# Patient Record
Sex: FEMALE | Race: WHITE | NOT HISPANIC OR LATINO | Employment: UNEMPLOYED | URBAN - METROPOLITAN AREA
[De-identification: names, ages, dates, MRNs, and addresses within clinical notes are randomized per-mention and may not be internally consistent; named-entity substitution may affect disease eponyms.]

---

## 2023-04-10 DIAGNOSIS — K21.9 GASTROESOPHAGEAL REFLUX DISEASE WITHOUT ESOPHAGITIS: Primary | ICD-10-CM

## 2023-04-10 PROBLEM — F41.9 ANXIETY: Status: ACTIVE | Noted: 2023-04-10

## 2023-04-10 PROBLEM — L23.1 ALLERGIC CONTACT DERMATITIS DUE TO ADHESIVES: Status: ACTIVE | Noted: 2023-04-10

## 2023-04-10 PROBLEM — N39.3 STRESS INCONTINENCE, FEMALE: Status: ACTIVE | Noted: 2023-04-10

## 2023-04-10 PROBLEM — R51.9 LEFT TEMPORAL HEADACHE: Status: ACTIVE | Noted: 2023-04-10

## 2023-04-10 PROBLEM — R79.89 ELEVATED TSH: Status: ACTIVE | Noted: 2023-04-10

## 2023-04-10 PROBLEM — I87.2 VENOUS (PERIPHERAL) INSUFFICIENCY: Status: ACTIVE | Noted: 2023-04-10

## 2023-04-10 PROBLEM — K59.00 CONSTIPATION, ACUTE: Status: ACTIVE | Noted: 2023-04-10

## 2023-04-10 PROBLEM — R53.83 FATIGUE: Status: ACTIVE | Noted: 2023-04-10

## 2023-04-10 PROBLEM — N39.0 RECURRENT UTI: Status: ACTIVE | Noted: 2023-04-10

## 2023-04-10 PROBLEM — G43.909 MIGRAINE HEADACHE: Status: ACTIVE | Noted: 2023-04-10

## 2023-04-10 PROBLEM — K13.0 ANGULAR CHEILITIS: Status: ACTIVE | Noted: 2023-04-10

## 2023-04-10 PROBLEM — I10 HYPERTENSION: Status: ACTIVE | Noted: 2023-04-10

## 2023-04-10 PROBLEM — N95.0 PMB (POSTMENOPAUSAL BLEEDING): Status: ACTIVE | Noted: 2023-04-10

## 2023-04-10 PROBLEM — Z18.9 RETAINED FOREIGN BODY: Status: ACTIVE | Noted: 2023-04-10

## 2023-04-10 PROBLEM — N93.9 ABNORMAL VAGINAL BLEEDING: Status: ACTIVE | Noted: 2023-04-10

## 2023-04-10 PROBLEM — R20.2 PARESTHESIA OF BOTH FEET: Status: ACTIVE | Noted: 2023-04-10

## 2023-04-10 PROBLEM — E66.3 OVERWEIGHT (BMI 25.0-29.9): Status: ACTIVE | Noted: 2023-04-10

## 2023-04-10 PROBLEM — E87.6 HYPOKALEMIA: Status: ACTIVE | Noted: 2023-04-10

## 2023-04-10 PROBLEM — R60.9 DEPENDENT EDEMA: Status: ACTIVE | Noted: 2023-04-10

## 2023-04-10 PROBLEM — R31.9 HEMATURIA: Status: ACTIVE | Noted: 2023-04-10

## 2023-04-10 PROBLEM — D50.9 IRON DEFICIENCY ANEMIA: Status: ACTIVE | Noted: 2023-04-10

## 2023-04-10 PROBLEM — Z86.011 HISTORY OF MENINGIOMA OF THE BRAIN: Status: ACTIVE | Noted: 2023-04-10

## 2023-04-10 PROBLEM — N39.0 URINARY TRACT INFECTION: Status: ACTIVE | Noted: 2023-04-10

## 2023-04-10 PROBLEM — T36.95XA ANTIBIOTIC-INDUCED YEAST INFECTION: Status: ACTIVE | Noted: 2023-04-10

## 2023-04-10 PROBLEM — R06.02 EXERTIONAL SHORTNESS OF BREATH: Status: ACTIVE | Noted: 2023-04-10

## 2023-04-10 PROBLEM — E87.1 HYPONATREMIA: Status: ACTIVE | Noted: 2023-04-10

## 2023-04-10 PROBLEM — N84.0 ENDOMETRIAL POLYP: Status: ACTIVE | Noted: 2023-04-10

## 2023-04-10 PROBLEM — R68.89 COLD INTOLERANCE: Status: ACTIVE | Noted: 2023-04-10

## 2023-04-10 PROBLEM — R68.89 COLD FEELING: Status: ACTIVE | Noted: 2023-04-10

## 2023-04-10 PROBLEM — R10.9 ABDOMINAL PAIN: Status: ACTIVE | Noted: 2023-04-10

## 2023-04-10 PROBLEM — T18.3XXA FOREIGN BODY IN SMALL INTESTINE: Status: ACTIVE | Noted: 2023-04-10

## 2023-04-10 PROBLEM — K62.5 RECTAL BLEED: Status: ACTIVE | Noted: 2023-04-10

## 2023-04-10 PROBLEM — R42 VERTIGO: Status: ACTIVE | Noted: 2023-04-10

## 2023-04-10 PROBLEM — H93.19 TINNITUS: Status: ACTIVE | Noted: 2023-04-10

## 2023-04-10 PROBLEM — N32.81 OVERACTIVE BLADDER: Status: ACTIVE | Noted: 2023-04-10

## 2023-04-10 PROBLEM — N39.0 ACUTE UTI: Status: ACTIVE | Noted: 2023-04-10

## 2023-04-10 PROBLEM — M54.16 LUMBAR RADICULOPATHY: Status: ACTIVE | Noted: 2023-04-10

## 2023-04-10 PROBLEM — B37.9 ANTIBIOTIC-INDUCED YEAST INFECTION: Status: ACTIVE | Noted: 2023-04-10

## 2023-04-10 PROBLEM — N81.4 UTERINE PROLAPSE: Status: ACTIVE | Noted: 2023-04-10

## 2023-04-10 PROBLEM — R32 URINARY INCONTINENCE IN FEMALE: Status: ACTIVE | Noted: 2023-04-10

## 2023-04-10 PROBLEM — E78.5 BORDERLINE HYPERLIPIDEMIA: Status: ACTIVE | Noted: 2023-04-10

## 2023-04-10 PROBLEM — N95.2 VAGINAL ATROPHY: Status: ACTIVE | Noted: 2023-04-10

## 2023-04-10 RX ORDER — ESOMEPRAZOLE MAGNESIUM 40 MG/1
40 CAPSULE, DELAYED RELEASE ORAL DAILY
Qty: 90 CAPSULE | Refills: 1 | Status: SHIPPED | OUTPATIENT
Start: 2023-04-10 | End: 2023-10-03

## 2023-04-10 RX ORDER — ESOMEPRAZOLE MAGNESIUM 40 MG/1
40 CAPSULE, DELAYED RELEASE ORAL DAILY
COMMUNITY
End: 2023-04-10 | Stop reason: SDUPTHER

## 2023-04-28 DIAGNOSIS — M54.16 LUMBAR RADICULOPATHY: Primary | ICD-10-CM

## 2023-04-28 RX ORDER — GABAPENTIN 100 MG/1
200 CAPSULE ORAL NIGHTLY
COMMUNITY
End: 2023-04-28 | Stop reason: SDUPTHER

## 2023-04-29 DIAGNOSIS — F41.9 ANXIETY: ICD-10-CM

## 2023-04-29 DIAGNOSIS — I10 PRIMARY HYPERTENSION: Primary | ICD-10-CM

## 2023-04-29 RX ORDER — GABAPENTIN 100 MG/1
100-200 CAPSULE ORAL NIGHTLY
Qty: 180 CAPSULE | Refills: 1 | Status: SHIPPED | OUTPATIENT
Start: 2023-04-29 | End: 2023-11-09 | Stop reason: SDUPTHER

## 2023-05-01 PROBLEM — N12 PYELONEPHRITIS: Status: ACTIVE | Noted: 2023-05-01

## 2023-05-01 RX ORDER — LOSARTAN POTASSIUM 100 MG/1
TABLET ORAL
Qty: 90 TABLET | Refills: 1 | Status: SHIPPED | OUTPATIENT
Start: 2023-05-01 | End: 2023-10-18 | Stop reason: WASHOUT

## 2023-05-01 RX ORDER — ESCITALOPRAM OXALATE 10 MG/1
TABLET ORAL
Qty: 90 TABLET | Refills: 1 | Status: SHIPPED | OUTPATIENT
Start: 2023-05-01 | End: 2023-10-26

## 2023-05-15 DIAGNOSIS — E78.5 BORDERLINE HYPERLIPIDEMIA: Primary | ICD-10-CM

## 2023-05-15 RX ORDER — PRAVASTATIN SODIUM 40 MG/1
40 TABLET ORAL DAILY
Qty: 90 TABLET | Refills: 1 | Status: SHIPPED | OUTPATIENT
Start: 2023-05-15 | End: 2023-09-12 | Stop reason: SDUPTHER

## 2023-05-15 RX ORDER — PRAVASTATIN SODIUM 40 MG/1
40 TABLET ORAL DAILY
COMMUNITY
End: 2023-05-15 | Stop reason: SDUPTHER

## 2023-07-06 RX ORDER — TROSPIUM CHLORIDE 20 MG/1
1 TABLET, FILM COATED ORAL 2 TIMES DAILY
COMMUNITY
Start: 2022-02-07 | End: 2023-07-13 | Stop reason: ALTCHOICE

## 2023-07-06 RX ORDER — SODIUM FLUORIDE 5 MG/G
GEL, DENTIFRICE DENTAL
COMMUNITY
Start: 2022-09-18 | End: 2023-10-18 | Stop reason: WASHOUT

## 2023-07-06 RX ORDER — ACETAMINOPHEN 325 MG/1
TABLET ORAL
COMMUNITY
Start: 2021-11-05 | End: 2023-10-18 | Stop reason: WASHOUT

## 2023-07-06 RX ORDER — ZINC SULFATE 50(220)MG
CAPSULE ORAL
COMMUNITY
End: 2023-07-13 | Stop reason: ALTCHOICE

## 2023-07-06 RX ORDER — LORAZEPAM 0.5 MG/1
1 TABLET ORAL DAILY PRN
COMMUNITY
Start: 2014-01-09 | End: 2023-10-18 | Stop reason: WASHOUT

## 2023-07-06 RX ORDER — VIT C/E/ZN/COPPR/LUTEIN/ZEAXAN 250MG-90MG
CAPSULE ORAL
COMMUNITY
End: 2023-07-13 | Stop reason: ALTCHOICE

## 2023-07-06 RX ORDER — WHEAT DEXTRIN 3 G/3.5 G
1 POWDER IN PACKET (EA) ORAL
COMMUNITY
End: 2023-10-18 | Stop reason: WASHOUT

## 2023-07-06 RX ORDER — MUPIROCIN 20 MG/G
OINTMENT TOPICAL
COMMUNITY
Start: 2021-10-20 | End: 2023-07-13 | Stop reason: ALTCHOICE

## 2023-07-06 RX ORDER — OXYCODONE HYDROCHLORIDE 5 MG/1
TABLET ORAL
COMMUNITY
Start: 2021-11-05 | End: 2023-07-13 | Stop reason: ALTCHOICE

## 2023-07-06 RX ORDER — VITC/E/ZINC/COPPER/LUTEIN/ZEAX 250 MG-200
TABLET,CHEWABLE ORAL
COMMUNITY
End: 2023-07-13 | Stop reason: ALTCHOICE

## 2023-07-06 RX ORDER — ESTRADIOL 0.1 MG/G
CREAM VAGINAL
COMMUNITY
Start: 2021-11-05 | End: 2023-07-13 | Stop reason: ALTCHOICE

## 2023-07-06 RX ORDER — CLONIDINE 0.2 MG/24H
PATCH, EXTENDED RELEASE TRANSDERMAL
COMMUNITY
Start: 2022-07-19 | End: 2023-07-13 | Stop reason: ALTCHOICE

## 2023-07-06 RX ORDER — ACETAMINOPHEN 500 MG
TABLET ORAL
COMMUNITY
End: 2023-07-13 | Stop reason: ALTCHOICE

## 2023-07-06 RX ORDER — STRONTIUM CHLORIDE HEXAHYDRATE 100 %
CRYSTALS MISCELLANEOUS
COMMUNITY
End: 2023-07-13 | Stop reason: ALTCHOICE

## 2023-07-06 RX ORDER — CHOLECALCIFEROL (VITAMIN D3) 25 MCG
1 TABLET ORAL DAILY
COMMUNITY
End: 2023-07-13 | Stop reason: ALTCHOICE

## 2023-07-06 RX ORDER — AMLODIPINE BESYLATE 5 MG/1
5 TABLET ORAL DAILY
COMMUNITY
End: 2023-09-11

## 2023-07-06 RX ORDER — ACETAMINOPHEN 160 MG/5ML
1 SUSPENSION, ORAL (FINAL DOSE FORM) ORAL DAILY
COMMUNITY
Start: 2016-02-17

## 2023-07-06 RX ORDER — MULTIVIT-MIN/IRON/FOLIC ACID/K 18-600-40
1000 CAPSULE ORAL DAILY
COMMUNITY
End: 2023-10-18 | Stop reason: ALTCHOICE

## 2023-07-06 RX ORDER — CLONIDINE HYDROCHLORIDE 0.1 MG/1
0.1 TABLET ORAL 2 TIMES DAILY
COMMUNITY
End: 2023-08-15

## 2023-07-06 RX ORDER — IBUPROFEN 600 MG/1
600 TABLET ORAL EVERY 6 HOURS
COMMUNITY
End: 2023-10-18 | Stop reason: WASHOUT

## 2023-07-06 RX ORDER — RIBOFLAVIN (VITAMIN B2) 100 MG
2 TABLET ORAL DAILY
COMMUNITY

## 2023-07-13 ENCOUNTER — OFFICE VISIT (OUTPATIENT)
Dept: PRIMARY CARE | Facility: CLINIC | Age: 78
End: 2023-07-13
Payer: MEDICARE

## 2023-07-13 VITALS
BODY MASS INDEX: 25.47 KG/M2 | HEART RATE: 63 BPM | RESPIRATION RATE: 22 BRPM | SYSTOLIC BLOOD PRESSURE: 124 MMHG | DIASTOLIC BLOOD PRESSURE: 78 MMHG | WEIGHT: 124 LBS

## 2023-07-13 DIAGNOSIS — R20.2 PARESTHESIA OF BOTH FEET: ICD-10-CM

## 2023-07-13 DIAGNOSIS — N32.81 OVERACTIVE BLADDER: ICD-10-CM

## 2023-07-13 DIAGNOSIS — M25.50 ARTHRALGIA, UNSPECIFIED JOINT: ICD-10-CM

## 2023-07-13 DIAGNOSIS — M79.10 MYALGIA: ICD-10-CM

## 2023-07-13 DIAGNOSIS — K21.9 GASTROESOPHAGEAL REFLUX DISEASE WITHOUT ESOPHAGITIS: ICD-10-CM

## 2023-07-13 DIAGNOSIS — E87.6 HYPOKALEMIA: ICD-10-CM

## 2023-07-13 DIAGNOSIS — R21 RASH AND NONSPECIFIC SKIN ERUPTION: ICD-10-CM

## 2023-07-13 DIAGNOSIS — I10 PRIMARY HYPERTENSION: Primary | ICD-10-CM

## 2023-07-13 DIAGNOSIS — M54.16 LUMBAR RADICULOPATHY: ICD-10-CM

## 2023-07-13 PROBLEM — K62.5 RECTAL BLEED: Status: RESOLVED | Noted: 2023-04-10 | Resolved: 2023-07-13

## 2023-07-13 PROBLEM — B37.9 ANTIBIOTIC-INDUCED YEAST INFECTION: Status: RESOLVED | Noted: 2023-04-10 | Resolved: 2023-07-13

## 2023-07-13 PROBLEM — N93.9 ABNORMAL VAGINAL BLEEDING: Status: RESOLVED | Noted: 2023-04-10 | Resolved: 2023-07-13

## 2023-07-13 PROBLEM — T36.95XA ANTIBIOTIC-INDUCED YEAST INFECTION: Status: RESOLVED | Noted: 2023-04-10 | Resolved: 2023-07-13

## 2023-07-13 PROBLEM — R68.89 COLD FEELING: Status: RESOLVED | Noted: 2023-04-10 | Resolved: 2023-07-13

## 2023-07-13 PROBLEM — R60.9 DEPENDENT EDEMA: Status: RESOLVED | Noted: 2023-04-10 | Resolved: 2023-07-13

## 2023-07-13 PROBLEM — R68.89 COLD INTOLERANCE: Status: RESOLVED | Noted: 2023-04-10 | Resolved: 2023-07-13

## 2023-07-13 PROBLEM — N12 PYELONEPHRITIS: Status: RESOLVED | Noted: 2023-05-01 | Resolved: 2023-07-13

## 2023-07-13 PROBLEM — N39.0 ACUTE UTI: Status: RESOLVED | Noted: 2023-04-10 | Resolved: 2023-07-13

## 2023-07-13 PROBLEM — N39.0 URINARY TRACT INFECTION: Status: RESOLVED | Noted: 2023-04-10 | Resolved: 2023-07-13

## 2023-07-13 PROBLEM — N39.0 RECURRENT UTI: Status: RESOLVED | Noted: 2023-04-10 | Resolved: 2023-07-13

## 2023-07-13 PROCEDURE — 82607 VITAMIN B-12: CPT

## 2023-07-13 PROCEDURE — 3078F DIAST BP <80 MM HG: CPT | Performed by: INTERNAL MEDICINE

## 2023-07-13 PROCEDURE — 1036F TOBACCO NON-USER: CPT | Performed by: INTERNAL MEDICINE

## 2023-07-13 PROCEDURE — 1159F MED LIST DOCD IN RCRD: CPT | Performed by: INTERNAL MEDICINE

## 2023-07-13 PROCEDURE — 1126F AMNT PAIN NOTED NONE PRSNT: CPT | Performed by: INTERNAL MEDICINE

## 2023-07-13 PROCEDURE — 85652 RBC SED RATE AUTOMATED: CPT

## 2023-07-13 PROCEDURE — 1160F RVW MEDS BY RX/DR IN RCRD: CPT | Performed by: INTERNAL MEDICINE

## 2023-07-13 PROCEDURE — 82550 ASSAY OF CK (CPK): CPT

## 2023-07-13 PROCEDURE — 3074F SYST BP LT 130 MM HG: CPT | Performed by: INTERNAL MEDICINE

## 2023-07-13 PROCEDURE — 86140 C-REACTIVE PROTEIN: CPT

## 2023-07-13 PROCEDURE — 80048 BASIC METABOLIC PNL TOTAL CA: CPT

## 2023-07-13 PROCEDURE — 99214 OFFICE O/P EST MOD 30 MIN: CPT | Performed by: INTERNAL MEDICINE

## 2023-07-13 RX ORDER — TRIAMCINOLONE ACETONIDE 1 MG/G
OINTMENT TOPICAL 2 TIMES DAILY PRN
Qty: 15 G | Refills: 0 | Status: SHIPPED | OUTPATIENT
Start: 2023-07-13 | End: 2023-10-18 | Stop reason: WASHOUT

## 2023-07-13 ASSESSMENT — ENCOUNTER SYMPTOMS
FREQUENCY: 0
BLOOD IN STOOL: 0
FLANK PAIN: 0
CHEST TIGHTNESS: 0
WHEEZING: 0
VOMITING: 0
DIARRHEA: 0
DIFFICULTY URINATING: 0
BACK PAIN: 1
ABDOMINAL PAIN: 0
CHILLS: 0
DYSURIA: 0
PALPITATIONS: 0
FATIGUE: 0
UNEXPECTED WEIGHT CHANGE: 0
SHORTNESS OF BREATH: 0
APPETITE CHANGE: 0
NAUSEA: 0
ACTIVITY CHANGE: 0
COUGH: 0
FEVER: 0
ARTHRALGIAS: 1
CONSTIPATION: 1

## 2023-07-13 NOTE — ASSESSMENT & PLAN NOTE
Has dry cough-may be due to uncontrolled GERD  On PPI therapy; encouraged her to continue use  Diet modifications

## 2023-07-13 NOTE — PATIENT INSTRUCTIONS
For 2 weeks stop taking your pravastatin (cholesterol medication) and see if your joint and back pain improve-if they do stay off the medication and let me know  If pain persists (continues) even off the medication restart it as it was not the cause   Put the steroid cream that we prescribed to the skin lesions on right lower leg 2 times a day for 1-2 weeks then can use only as needed  We did labs today and will call with results  Continue all other medications as directed   Follow up in 3 months-sooner if needed

## 2023-07-13 NOTE — ASSESSMENT & PLAN NOTE
Will have her stop statin therapy for 2 weeks to see if this helps improve her joint pain  Will do some labs today  May consider adding Meloxicam to help manage joint pain-may just be osteoarthritis

## 2023-07-13 NOTE — PROGRESS NOTES
Subjective   Patient ID: Valerie Franklin is a 77 y.o. female who presents for 6 month follow up.    HPI  Pt here in 6 month follow up.  She is doing ok overall.      She is complaining of dry cough.  She was wondering if her medication is cause.  She was in particular worried about esomeprazole.  She has more joint pain as well-her shoulders in particular.      She continues to have low back pain that radiates down the left leg.   She will use ibuprofen for her headaches.  She switches between Tylenol and ibuprofen use and will take doses almost every day.      Review of Systems   Constitutional:  Negative for activity change, appetite change, chills, fatigue, fever and unexpected weight change.   Respiratory:  Negative for cough, chest tightness, shortness of breath and wheezing.    Cardiovascular:  Negative for chest pain, palpitations and leg swelling.   Gastrointestinal:  Positive for constipation. Negative for abdominal pain, blood in stool, diarrhea, nausea and vomiting.   Genitourinary:  Negative for difficulty urinating, dysuria, flank pain and frequency.   Musculoskeletal:  Positive for arthralgias and back pain.       Objective   /78   Pulse 63   Resp 22   Wt 56.2 kg (124 lb)   BMI 25.47 kg/m²    Physical Exam  Constitutional:       Appearance: Normal appearance.   Cardiovascular:      Rate and Rhythm: Normal rate and regular rhythm.      Heart sounds: Normal heart sounds.   Pulmonary:      Effort: Pulmonary effort is normal.      Breath sounds: Normal breath sounds.   Abdominal:      General: Bowel sounds are normal.      Palpations: Abdomen is soft.   Musculoskeletal:         General: No swelling, deformity or signs of injury. Normal range of motion.      Right lower leg: No edema.      Left lower leg: No edema.   Lymphadenopathy:      Cervical: No cervical adenopathy.   Skin:     General: Skin is warm and dry.      Findings: Lesion (3 small red flat lesions noted-look like skin abrasions on  anterior shin of right leg) present.   Neurological:      Mental Status: She is alert and oriented to person, place, and time.   Psychiatric:         Mood and Affect: Mood normal.         Assessment/Plan   Problem List Items Addressed This Visit       GERD (gastroesophageal reflux disease)     Has dry cough-may be due to uncontrolled GERD  On PPI therapy; encouraged her to continue use  Diet modifications          Relevant Orders    Follow Up In Primary Care - Established    Hypertension - Primary    Relevant Orders    Follow Up In Primary Care - Established    Hypokalemia    Relevant Orders    Basic Metabolic Panel    Follow Up In Primary Care - Established    Lumbar radiculopathy     Chronic issue         Relevant Orders    Follow Up In Primary Care - Established    Overactive bladder    Relevant Orders    Follow Up In Primary Care - Established    Paresthesia of both feet    Relevant Orders    Vitamin B12    Follow Up In Primary Care - Established    Arthralgia     Will have her stop statin therapy for 2 weeks to see if this helps improve her joint pain  Will do some labs today  May consider adding Meloxicam to help manage joint pain-may just be osteoarthritis          Relevant Orders    C-Reactive Protein    Sedimentation Rate    Follow Up In Primary Care - Established     Other Visit Diagnoses       Rash and nonspecific skin eruption        Relevant Medications    triamcinolone (Kenalog) 0.1 % ointment    Other Relevant Orders    Follow Up In Primary Care - Established    Myalgia        Relevant Orders    CK    Follow Up In Primary Care - Established

## 2023-07-14 LAB
ANION GAP IN SER/PLAS: 14 MMOL/L (ref 10–20)
C REACTIVE PROTEIN (MG/L) IN SER/PLAS: 0.15 MG/DL
CALCIUM (MG/DL) IN SER/PLAS: 9.7 MG/DL (ref 8.6–10.6)
CARBON DIOXIDE, TOTAL (MMOL/L) IN SER/PLAS: 28 MMOL/L (ref 21–32)
CHLORIDE (MMOL/L) IN SER/PLAS: 98 MMOL/L (ref 98–107)
COBALAMIN (VITAMIN B12) (PG/ML) IN SER/PLAS: 351 PG/ML (ref 211–911)
CREATINE KINASE (U/L) IN SER/PLAS: 89 U/L (ref 0–215)
CREATININE (MG/DL) IN SER/PLAS: 0.61 MG/DL (ref 0.5–1.05)
GFR FEMALE: >90 ML/MIN/1.73M2
GLUCOSE (MG/DL) IN SER/PLAS: 88 MG/DL (ref 74–99)
POTASSIUM (MMOL/L) IN SER/PLAS: 4.8 MMOL/L (ref 3.5–5.3)
SEDIMENTATION RATE, ERYTHROCYTE: 3 MM/H (ref 0–30)
SODIUM (MMOL/L) IN SER/PLAS: 135 MMOL/L (ref 136–145)
UREA NITROGEN (MG/DL) IN SER/PLAS: 10 MG/DL (ref 6–23)

## 2023-08-15 DIAGNOSIS — I10 PRIMARY HYPERTENSION: Primary | ICD-10-CM

## 2023-08-15 RX ORDER — CLONIDINE HYDROCHLORIDE 0.1 MG/1
0.1 TABLET ORAL 2 TIMES DAILY
Qty: 180 TABLET | Refills: 1 | Status: SHIPPED | OUTPATIENT
Start: 2023-08-15 | End: 2024-02-08

## 2023-09-06 LAB
ALANINE AMINOTRANSFERASE (SGPT) (U/L) IN SER/PLAS: 13 U/L (ref 7–45)
ALBUMIN (G/DL) IN SER/PLAS: 4.1 G/DL (ref 3.4–5)
ALKALINE PHOSPHATASE (U/L) IN SER/PLAS: 61 U/L (ref 33–136)
ANION GAP IN SER/PLAS: 12 MMOL/L (ref 10–20)
ASPARTATE AMINOTRANSFERASE (SGOT) (U/L) IN SER/PLAS: 16 U/L (ref 9–39)
BASOPHILS (10*3/UL) IN BLOOD BY AUTOMATED COUNT: 0.06 X10E9/L (ref 0–0.1)
BASOPHILS/100 LEUKOCYTES IN BLOOD BY AUTOMATED COUNT: 1 % (ref 0–2)
BILIRUBIN TOTAL (MG/DL) IN SER/PLAS: 0.5 MG/DL (ref 0–1.2)
CALCIUM (MG/DL) IN SER/PLAS: 9.4 MG/DL (ref 8.6–10.6)
CARBON DIOXIDE, TOTAL (MMOL/L) IN SER/PLAS: 30 MMOL/L (ref 21–32)
CHLORIDE (MMOL/L) IN SER/PLAS: 98 MMOL/L (ref 98–107)
CREATININE (MG/DL) IN SER/PLAS: 0.64 MG/DL (ref 0.5–1.05)
EOSINOPHILS (10*3/UL) IN BLOOD BY AUTOMATED COUNT: 0.23 X10E9/L (ref 0–0.4)
EOSINOPHILS/100 LEUKOCYTES IN BLOOD BY AUTOMATED COUNT: 3.7 % (ref 0–6)
ERYTHROCYTE DISTRIBUTION WIDTH (RATIO) BY AUTOMATED COUNT: 12.5 % (ref 11.5–14.5)
ERYTHROCYTE MEAN CORPUSCULAR HEMOGLOBIN CONCENTRATION (G/DL) BY AUTOMATED: 33.1 G/DL (ref 32–36)
ERYTHROCYTE MEAN CORPUSCULAR VOLUME (FL) BY AUTOMATED COUNT: 85 FL (ref 80–100)
ERYTHROCYTES (10*6/UL) IN BLOOD BY AUTOMATED COUNT: 4.62 X10E12/L (ref 4–5.2)
FERRITIN (UG/LL) IN SER/PLAS: 54 UG/L (ref 8–150)
GFR FEMALE: >90 ML/MIN/1.73M2
GLUCOSE (MG/DL) IN SER/PLAS: 85 MG/DL (ref 74–99)
HEMATOCRIT (%) IN BLOOD BY AUTOMATED COUNT: 39.3 % (ref 36–46)
HEMOGLOBIN (G/DL) IN BLOOD: 13 G/DL (ref 12–16)
IMMATURE GRANULOCYTES/100 LEUKOCYTES IN BLOOD BY AUTOMATED COUNT: 0.5 % (ref 0–0.9)
IRON (UG/DL) IN SER/PLAS: 100 UG/DL (ref 35–150)
IRON BINDING CAPACITY (UG/DL) IN SER/PLAS: 332 UG/DL (ref 240–445)
IRON SATURATION (%) IN SER/PLAS: 30 % (ref 25–45)
LEUKOCYTES (10*3/UL) IN BLOOD BY AUTOMATED COUNT: 6.3 X10E9/L (ref 4.4–11.3)
LYMPHOCYTES (10*3/UL) IN BLOOD BY AUTOMATED COUNT: 1.45 X10E9/L (ref 0.8–3)
LYMPHOCYTES/100 LEUKOCYTES IN BLOOD BY AUTOMATED COUNT: 23 % (ref 13–44)
MONOCYTES (10*3/UL) IN BLOOD BY AUTOMATED COUNT: 0.54 X10E9/L (ref 0.05–0.8)
MONOCYTES/100 LEUKOCYTES IN BLOOD BY AUTOMATED COUNT: 8.6 % (ref 2–10)
NEUTROPHILS (10*3/UL) IN BLOOD BY AUTOMATED COUNT: 3.99 X10E9/L (ref 1.6–5.5)
NEUTROPHILS/100 LEUKOCYTES IN BLOOD BY AUTOMATED COUNT: 63.2 % (ref 40–80)
NRBC (PER 100 WBCS) BY AUTOMATED COUNT: 0 /100 WBC (ref 0–0)
PLATELETS (10*3/UL) IN BLOOD AUTOMATED COUNT: 301 X10E9/L (ref 150–450)
POTASSIUM (MMOL/L) IN SER/PLAS: 4.2 MMOL/L (ref 3.5–5.3)
PROTEIN TOTAL: 6.5 G/DL (ref 6.4–8.2)
SODIUM (MMOL/L) IN SER/PLAS: 136 MMOL/L (ref 136–145)
UREA NITROGEN (MG/DL) IN SER/PLAS: 9 MG/DL (ref 6–23)

## 2023-09-09 DIAGNOSIS — I10 PRIMARY HYPERTENSION: ICD-10-CM

## 2023-09-11 DIAGNOSIS — I10 PRIMARY HYPERTENSION: ICD-10-CM

## 2023-09-11 RX ORDER — AMLODIPINE BESYLATE 5 MG/1
5 TABLET ORAL DAILY
Qty: 90 TABLET | Refills: 1 | Status: SHIPPED | OUTPATIENT
Start: 2023-09-11 | End: 2024-03-07

## 2023-09-11 RX ORDER — AMLODIPINE BESYLATE 5 MG/1
5 TABLET ORAL DAILY
Qty: 90 TABLET | Refills: 1 | OUTPATIENT
Start: 2023-09-11

## 2023-09-12 DIAGNOSIS — E78.5 BORDERLINE HYPERLIPIDEMIA: ICD-10-CM

## 2023-09-12 RX ORDER — PRAVASTATIN SODIUM 40 MG/1
40 TABLET ORAL DAILY
Qty: 90 TABLET | Refills: 1 | Status: SHIPPED | OUTPATIENT
Start: 2023-09-12 | End: 2024-01-22 | Stop reason: ALTCHOICE

## 2023-09-18 PROBLEM — C44.92 SCC (SQUAMOUS CELL CARCINOMA): Status: ACTIVE | Noted: 2023-09-18

## 2023-09-18 PROBLEM — H49.20 6TH NERVE PALSY: Status: ACTIVE | Noted: 2023-09-18

## 2023-09-18 RX ORDER — LOSARTAN POTASSIUM 50 MG/1
1 TABLET ORAL NIGHTLY
COMMUNITY
Start: 2020-08-06 | End: 2023-11-01

## 2023-09-18 RX ORDER — AMLODIPINE AND OLMESARTAN MEDOXOMIL 10; 40 MG/1; MG/1
TABLET ORAL
COMMUNITY
Start: 2012-05-09 | End: 2023-10-18 | Stop reason: WASHOUT

## 2023-09-18 RX ORDER — IBUPROFEN 100 MG/5ML
1000 SUSPENSION, ORAL (FINAL DOSE FORM) ORAL
COMMUNITY

## 2023-09-18 RX ORDER — VIT C/E/ZN/COPPR/LUTEIN/ZEAXAN 250MG-90MG
2000 CAPSULE ORAL
COMMUNITY
End: 2023-10-18 | Stop reason: WASHOUT

## 2023-09-18 RX ORDER — CLONIDINE HYDROCHLORIDE 0.1 MG/1
0.1 TABLET ORAL 2 TIMES DAILY
COMMUNITY
Start: 2018-07-06 | End: 2023-10-18 | Stop reason: WASHOUT

## 2023-09-18 RX ORDER — ACETAMINOPHEN 160 MG/5ML
100 SUSPENSION, ORAL (FINAL DOSE FORM) ORAL 2 TIMES DAILY
COMMUNITY
End: 2023-10-18 | Stop reason: SDUPTHER

## 2023-10-03 DIAGNOSIS — K21.9 GASTROESOPHAGEAL REFLUX DISEASE WITHOUT ESOPHAGITIS: ICD-10-CM

## 2023-10-03 RX ORDER — ESOMEPRAZOLE MAGNESIUM 40 MG/1
40 CAPSULE, DELAYED RELEASE ORAL DAILY
Qty: 90 CAPSULE | Refills: 1 | Status: SHIPPED | OUTPATIENT
Start: 2023-10-03 | End: 2023-10-18 | Stop reason: WASHOUT

## 2023-10-18 ENCOUNTER — OFFICE VISIT (OUTPATIENT)
Dept: PRIMARY CARE | Facility: CLINIC | Age: 78
End: 2023-10-18
Payer: MEDICARE

## 2023-10-18 ENCOUNTER — ANCILLARY PROCEDURE (OUTPATIENT)
Dept: RADIOLOGY | Facility: CLINIC | Age: 78
End: 2023-10-18
Payer: MEDICARE

## 2023-10-18 VITALS
HEART RATE: 68 BPM | SYSTOLIC BLOOD PRESSURE: 126 MMHG | DIASTOLIC BLOOD PRESSURE: 84 MMHG | WEIGHT: 126.3 LBS | BODY MASS INDEX: 26.4 KG/M2

## 2023-10-18 DIAGNOSIS — M79.10 MYALGIA: ICD-10-CM

## 2023-10-18 DIAGNOSIS — R07.9 CHEST PAIN, UNSPECIFIED TYPE: ICD-10-CM

## 2023-10-18 DIAGNOSIS — R20.2 PARESTHESIA OF BOTH FEET: ICD-10-CM

## 2023-10-18 DIAGNOSIS — N32.81 OVERACTIVE BLADDER: ICD-10-CM

## 2023-10-18 DIAGNOSIS — E87.6 HYPOKALEMIA: ICD-10-CM

## 2023-10-18 DIAGNOSIS — K21.9 GASTROESOPHAGEAL REFLUX DISEASE WITHOUT ESOPHAGITIS: ICD-10-CM

## 2023-10-18 DIAGNOSIS — M54.16 LUMBAR RADICULOPATHY: ICD-10-CM

## 2023-10-18 DIAGNOSIS — M25.50 ARTHRALGIA, UNSPECIFIED JOINT: ICD-10-CM

## 2023-10-18 DIAGNOSIS — I10 ESSENTIAL HYPERTENSION, BENIGN: Primary | ICD-10-CM

## 2023-10-18 DIAGNOSIS — R06.02 EXERTIONAL SHORTNESS OF BREATH: ICD-10-CM

## 2023-10-18 PROCEDURE — 1159F MED LIST DOCD IN RCRD: CPT | Performed by: INTERNAL MEDICINE

## 2023-10-18 PROCEDURE — 3074F SYST BP LT 130 MM HG: CPT | Performed by: INTERNAL MEDICINE

## 2023-10-18 PROCEDURE — 1036F TOBACCO NON-USER: CPT | Performed by: INTERNAL MEDICINE

## 2023-10-18 PROCEDURE — 99214 OFFICE O/P EST MOD 30 MIN: CPT | Performed by: INTERNAL MEDICINE

## 2023-10-18 PROCEDURE — 1126F AMNT PAIN NOTED NONE PRSNT: CPT | Performed by: INTERNAL MEDICINE

## 2023-10-18 PROCEDURE — 71046 X-RAY EXAM CHEST 2 VIEWS: CPT | Performed by: RADIOLOGY

## 2023-10-18 PROCEDURE — 93000 ELECTROCARDIOGRAM COMPLETE: CPT | Performed by: INTERNAL MEDICINE

## 2023-10-18 PROCEDURE — 3079F DIAST BP 80-89 MM HG: CPT | Performed by: INTERNAL MEDICINE

## 2023-10-18 PROCEDURE — 1160F RVW MEDS BY RX/DR IN RCRD: CPT | Performed by: INTERNAL MEDICINE

## 2023-10-18 PROCEDURE — 71046 X-RAY EXAM CHEST 2 VIEWS: CPT | Mod: FY

## 2023-10-18 RX ORDER — FERROUS SULFATE 325(65) MG
65 TABLET, DELAYED RELEASE (ENTERIC COATED) ORAL EVERY OTHER DAY
COMMUNITY

## 2023-10-18 RX ORDER — ESOMEPRAZOLE MAGNESIUM 40 MG/1
40 CAPSULE, DELAYED RELEASE ORAL DAILY
Qty: 90 CAPSULE | Refills: 1
Start: 2023-10-18 | End: 2024-04-04

## 2023-10-18 ASSESSMENT — ENCOUNTER SYMPTOMS
CONSTIPATION: 0
DIFFICULTY URINATING: 0
VOMITING: 0
COUGH: 0
NAUSEA: 0
FATIGUE: 1
ACTIVITY CHANGE: 0
UNEXPECTED WEIGHT CHANGE: 0
SHORTNESS OF BREATH: 1
LIGHT-HEADEDNESS: 0
HEADACHES: 0
FEVER: 0
CHILLS: 0
APPETITE CHANGE: 0
WHEEZING: 0
CHEST TIGHTNESS: 0
ABDOMINAL PAIN: 0
PALPITATIONS: 0
DIARRHEA: 0
DIZZINESS: 0

## 2023-10-18 NOTE — PATIENT INSTRUCTIONS
Get xray of chest today due to some chest tightness and breathing changes   We did EKG today and would like you to call to schedule stress test (we are doing dobutamine since you cannot walk long on treadmill)  Make sure you are taking Esomeprazole 40 mg once a day  Avoid and/or lessen amount of tomato based/caffeine/alcohol/tobacco use/spicy foods/peppers/onions/citrus and antiinflammatories as well as avoiding eating late at night due to chest pain which may be acid related  Get your flu shot when able  Follow up in 3 months-full physical at that time; sooner based on results

## 2023-10-18 NOTE — ASSESSMENT & PLAN NOTE
Atypical and likely GERD related  EKG was noted to be ok here in office  Will have her do stress test-unable to do treadmill due to back pain so will do dobutamine  Xray chest due to chest tightness/sob noted

## 2023-10-18 NOTE — PROGRESS NOTES
Subjective   Patient ID: Valerie Franklin is a 77 y.o. female who presents for Follow-up (3m).    HPI  Pt here for 3 month follow up.      She tells me she has pressure sensation of chest almost daily.  The pain is center in chest.  She feels more short of breath.  She will often find herself taking a deep breath to try to get sensation to go away.  She denies movement of pain and doesn't feel it is worse with exertion.  No lightheadedness or dizzy spells.  She notes it more in AM but can be throughout day.  She is not on PPI daily-she is only using Omeprazole PRN at this time.    She has cut back on caffeine to half and half.  She will eat some spicy foods but will note it bothers her stomach so she doesn't eat much.  She does have one alcohol beverage a night.      Review of Systems   Constitutional:  Positive for fatigue. Negative for activity change, appetite change, chills, fever and unexpected weight change.   Respiratory:  Positive for shortness of breath. Negative for cough, chest tightness and wheezing.    Cardiovascular:  Positive for chest pain. Negative for palpitations and leg swelling.   Gastrointestinal:  Negative for abdominal pain, constipation, diarrhea, nausea and vomiting.   Genitourinary:  Negative for difficulty urinating.   Neurological:  Negative for dizziness, light-headedness and headaches.       Objective   /84 (BP Location: Left arm, Patient Position: Sitting)   Pulse 68   Wt 57.3 kg (126 lb 4.8 oz)   BMI 26.40 kg/m²    Physical Exam  Constitutional:       Appearance: Normal appearance.   Cardiovascular:      Rate and Rhythm: Normal rate and regular rhythm.      Heart sounds: Normal heart sounds.   Pulmonary:      Effort: Pulmonary effort is normal.      Breath sounds: Normal breath sounds.   Abdominal:      General: Abdomen is flat. Bowel sounds are normal. There is no distension.      Palpations: Abdomen is soft.      Tenderness: There is no abdominal tenderness.    Musculoskeletal:         General: Normal range of motion.      Right lower leg: No edema.      Left lower leg: No edema.   Neurological:      Mental Status: She is alert and oriented to person, place, and time.   Psychiatric:         Mood and Affect: Mood normal.         Assessment/Plan   Problem List Items Addressed This Visit       Exertional shortness of breath    Relevant Orders    Follow Up In Primary Care - Medicare Annual    GERD (gastroesophageal reflux disease)     Pt on PPI therapy  She does have caffeine/alcohol daily  Asked her to stop alcohol if able before bed as GERD can worsen at night and this could be cause of heaviness she feels in chest especially in AM          Relevant Medications    esomeprazole (NexIUM) 40 mg DR capsule    Other Relevant Orders    Follow Up In Primary Care - Medicare Annual    Hypokalemia    Relevant Orders    Follow Up In Primary Care - Medicare Annual    Lumbar radiculopathy    Relevant Orders    Follow Up In Primary Care - Medicare Annual    Overactive bladder    Relevant Orders    Follow Up In Primary Care - Medicare Annual    Paresthesia of both feet    Relevant Orders    Follow Up In Primary Care - Medicare Annual    Arthralgia    Relevant Orders    Follow Up In Primary Care - Medicare Annual    Essential hypertension, benign - Primary    Relevant Orders    Follow Up In Primary Care - Medicare Annual    Chest pain     Atypical and likely GERD related  EKG was noted to be ok here in office  Will have her do stress test-unable to do treadmill due to back pain so will do dobutamine  Xray chest due to chest tightness/sob noted         Relevant Orders    XR chest 2 views    ECG 12 Lead (Completed)    Echocardiogram Stress Test    Follow Up In Primary Care - Medicare Annual     Other Visit Diagnoses       Myalgia        Relevant Orders    Follow Up In Primary Care - Medicare Annual

## 2023-10-18 NOTE — ASSESSMENT & PLAN NOTE
Pt on PPI therapy  She does have caffeine/alcohol daily  Asked her to stop alcohol if able before bed as GERD can worsen at night and this could be cause of heaviness she feels in chest especially in AM

## 2023-10-19 ENCOUNTER — TELEPHONE (OUTPATIENT)
Dept: PRIMARY CARE | Facility: CLINIC | Age: 78
End: 2023-10-19
Payer: MEDICARE

## 2023-10-19 NOTE — TELEPHONE ENCOUNTER
----- Message from Alysia HOWE DO sent at 10/19/2023  9:54 AM EDT -----  Let pt know chest xray was normal/no fluid noted or abnormalities in lungs

## 2023-10-26 DIAGNOSIS — F41.9 ANXIETY: ICD-10-CM

## 2023-10-26 DIAGNOSIS — I10 PRIMARY HYPERTENSION: ICD-10-CM

## 2023-10-26 RX ORDER — ESCITALOPRAM OXALATE 10 MG/1
TABLET ORAL
Qty: 90 TABLET | Refills: 1 | Status: SHIPPED | OUTPATIENT
Start: 2023-10-26 | End: 2024-04-24

## 2023-10-26 RX ORDER — LOSARTAN POTASSIUM 50 MG/1
50 TABLET ORAL DAILY
Qty: 90 TABLET | Refills: 1 | Status: SHIPPED | OUTPATIENT
Start: 2023-10-26 | End: 2023-11-01

## 2023-10-31 DIAGNOSIS — I10 PRIMARY HYPERTENSION: ICD-10-CM

## 2023-11-01 RX ORDER — LOSARTAN POTASSIUM 100 MG/1
100 TABLET ORAL DAILY
Qty: 90 TABLET | Refills: 1 | Status: SHIPPED | OUTPATIENT
Start: 2023-11-01 | End: 2024-04-24

## 2023-11-09 DIAGNOSIS — M54.16 LUMBAR RADICULOPATHY: ICD-10-CM

## 2023-11-09 RX ORDER — GABAPENTIN 100 MG/1
100-200 CAPSULE ORAL NIGHTLY
Qty: 180 CAPSULE | Refills: 1 | Status: SHIPPED | OUTPATIENT
Start: 2023-11-09 | End: 2024-05-08 | Stop reason: SDUPTHER

## 2023-12-04 ENCOUNTER — APPOINTMENT (OUTPATIENT)
Dept: OBSTETRICS AND GYNECOLOGY | Facility: CLINIC | Age: 78
End: 2023-12-04
Payer: MEDICARE

## 2023-12-04 DIAGNOSIS — R07.9 CHEST PAIN, UNSPECIFIED TYPE: Primary | ICD-10-CM

## 2023-12-11 RX ORDER — ATROPINE SULFATE 0.1 MG/ML
.25-5 INJECTION INTRAVENOUS ONCE AS NEEDED
Qty: 5 ML | Refills: 0 | Status: SHIPPED | OUTPATIENT
Start: 2023-12-11 | End: 2024-01-22 | Stop reason: ALTCHOICE

## 2023-12-11 RX ORDER — DOBUTAMINE HYDROCHLORIDE 100 MG/100ML
5-40 INJECTION INTRAVENOUS CONTINUOUS
Qty: 250 ML | Refills: 0 | Status: SHIPPED | OUTPATIENT
Start: 2023-12-11 | End: 2024-01-22 | Stop reason: ALTCHOICE

## 2023-12-12 ENCOUNTER — HOSPITAL ENCOUNTER (OUTPATIENT)
Dept: CARDIOLOGY | Facility: HOSPITAL | Age: 78
Discharge: HOME | End: 2023-12-12
Payer: MEDICARE

## 2023-12-12 VITALS — WEIGHT: 126 LBS | BODY MASS INDEX: 26.34 KG/M2

## 2023-12-12 DIAGNOSIS — R07.9 CHEST PAIN, UNSPECIFIED TYPE: ICD-10-CM

## 2023-12-12 PROCEDURE — 93350 STRESS TTE ONLY: CPT | Performed by: STUDENT IN AN ORGANIZED HEALTH CARE EDUCATION/TRAINING PROGRAM

## 2023-12-12 PROCEDURE — 2500000004 HC RX 250 GENERAL PHARMACY W/ HCPCS (ALT 636 FOR OP/ED): Performed by: INTERNAL MEDICINE

## 2023-12-12 PROCEDURE — 93017 CV STRESS TEST TRACING ONLY: CPT

## 2023-12-12 PROCEDURE — 93018 CV STRESS TEST I&R ONLY: CPT | Performed by: STUDENT IN AN ORGANIZED HEALTH CARE EDUCATION/TRAINING PROGRAM

## 2023-12-12 PROCEDURE — 93016 CV STRESS TEST SUPVJ ONLY: CPT | Performed by: STUDENT IN AN ORGANIZED HEALTH CARE EDUCATION/TRAINING PROGRAM

## 2023-12-12 RX ORDER — ATROPINE SULFATE 0.4 MG/ML
.25-5 INJECTION, SOLUTION ENDOTRACHEAL; INTRAMEDULLARY; INTRAMUSCULAR; INTRAVENOUS; SUBCUTANEOUS
OUTPATIENT
Start: 2023-12-12

## 2023-12-12 RX ADMIN — DOBUTAMINE HYDROCHLORIDE 20 MCG/KG/MIN: 100 INJECTION INTRAVENOUS at 11:00

## 2024-01-22 ENCOUNTER — OFFICE VISIT (OUTPATIENT)
Dept: PRIMARY CARE | Facility: CLINIC | Age: 79
End: 2024-01-22
Payer: MEDICARE

## 2024-01-22 VITALS
WEIGHT: 127.2 LBS | DIASTOLIC BLOOD PRESSURE: 98 MMHG | HEIGHT: 58 IN | BODY MASS INDEX: 26.7 KG/M2 | RESPIRATION RATE: 16 BRPM | SYSTOLIC BLOOD PRESSURE: 146 MMHG | HEART RATE: 80 BPM

## 2024-01-22 DIAGNOSIS — Z00.00 ROUTINE GENERAL MEDICAL EXAMINATION AT A HEALTH CARE FACILITY: ICD-10-CM

## 2024-01-22 DIAGNOSIS — Z86.011 HISTORY OF MENINGIOMA OF THE BRAIN: ICD-10-CM

## 2024-01-22 DIAGNOSIS — N32.81 OVERACTIVE BLADDER: ICD-10-CM

## 2024-01-22 DIAGNOSIS — M54.16 LUMBAR RADICULOPATHY: ICD-10-CM

## 2024-01-22 DIAGNOSIS — D50.9 IRON DEFICIENCY ANEMIA, UNSPECIFIED IRON DEFICIENCY ANEMIA TYPE: ICD-10-CM

## 2024-01-22 DIAGNOSIS — R20.2 PARESTHESIA OF BOTH FEET: ICD-10-CM

## 2024-01-22 DIAGNOSIS — M25.50 ARTHRALGIA, UNSPECIFIED JOINT: ICD-10-CM

## 2024-01-22 DIAGNOSIS — M79.10 MYALGIA: ICD-10-CM

## 2024-01-22 DIAGNOSIS — Z12.31 ENCOUNTER FOR SCREENING MAMMOGRAM FOR MALIGNANT NEOPLASM OF BREAST: ICD-10-CM

## 2024-01-22 DIAGNOSIS — E66.3 OVERWEIGHT (BMI 25.0-29.9): ICD-10-CM

## 2024-01-22 DIAGNOSIS — K21.9 GASTROESOPHAGEAL REFLUX DISEASE WITHOUT ESOPHAGITIS: ICD-10-CM

## 2024-01-22 DIAGNOSIS — Z00.00 MEDICARE ANNUAL WELLNESS VISIT, SUBSEQUENT: Primary | ICD-10-CM

## 2024-01-22 DIAGNOSIS — E87.6 HYPOKALEMIA: ICD-10-CM

## 2024-01-22 DIAGNOSIS — E78.5 BORDERLINE HYPERLIPIDEMIA: ICD-10-CM

## 2024-01-22 DIAGNOSIS — Z78.0 POST-MENOPAUSE: ICD-10-CM

## 2024-01-22 DIAGNOSIS — I10 ESSENTIAL HYPERTENSION, BENIGN: ICD-10-CM

## 2024-01-22 PROBLEM — R07.9 CHEST PAIN: Status: RESOLVED | Noted: 2023-10-18 | Resolved: 2024-01-22

## 2024-01-22 PROBLEM — R53.83 FATIGUE: Status: RESOLVED | Noted: 2023-04-10 | Resolved: 2024-01-22

## 2024-01-22 PROBLEM — K13.0 ANGULAR CHEILITIS: Status: RESOLVED | Noted: 2023-04-10 | Resolved: 2024-01-22

## 2024-01-22 LAB
ALBUMIN SERPL BCP-MCNC: 4.7 G/DL (ref 3.4–5)
ALP SERPL-CCNC: 74 U/L (ref 33–136)
ALT SERPL W P-5'-P-CCNC: 19 U/L (ref 7–45)
AMORPH CRY #/AREA UR COMP ASSIST: ABNORMAL /HPF
ANION GAP SERPL CALC-SCNC: 15 MMOL/L (ref 10–20)
APPEARANCE UR: ABNORMAL
AST SERPL W P-5'-P-CCNC: 20 U/L (ref 9–39)
BACTERIA #/AREA URNS AUTO: ABNORMAL /HPF
BILIRUB SERPL-MCNC: 0.6 MG/DL (ref 0–1.2)
BILIRUB UR STRIP.AUTO-MCNC: NEGATIVE MG/DL
BUN SERPL-MCNC: 9 MG/DL (ref 6–23)
CALCIUM SERPL-MCNC: 9.8 MG/DL (ref 8.6–10.6)
CHLORIDE SERPL-SCNC: 99 MMOL/L (ref 98–107)
CHOLEST SERPL-MCNC: 195 MG/DL (ref 0–199)
CHOLESTEROL/HDL RATIO: 2.5
CO2 SERPL-SCNC: 28 MMOL/L (ref 21–32)
COLOR UR: YELLOW
CREAT SERPL-MCNC: 0.67 MG/DL (ref 0.5–1.05)
EGFRCR SERPLBLD CKD-EPI 2021: 90 ML/MIN/1.73M*2
ERYTHROCYTE [DISTWIDTH] IN BLOOD BY AUTOMATED COUNT: 13 % (ref 11.5–14.5)
GLUCOSE SERPL-MCNC: 78 MG/DL (ref 74–99)
GLUCOSE UR STRIP.AUTO-MCNC: NEGATIVE MG/DL
HCT VFR BLD AUTO: 44.3 % (ref 36–46)
HDLC SERPL-MCNC: 78.6 MG/DL
HGB BLD-MCNC: 14.5 G/DL (ref 12–16)
KETONES UR STRIP.AUTO-MCNC: NEGATIVE MG/DL
LDLC SERPL CALC-MCNC: 99 MG/DL
LEUKOCYTE ESTERASE UR QL STRIP.AUTO: ABNORMAL
MCH RBC QN AUTO: 27.3 PG (ref 26–34)
MCHC RBC AUTO-ENTMCNC: 32.7 G/DL (ref 32–36)
MCV RBC AUTO: 83 FL (ref 80–100)
NITRITE UR QL STRIP.AUTO: POSITIVE
NON HDL CHOLESTEROL: 116 MG/DL (ref 0–149)
NRBC BLD-RTO: 0 /100 WBCS (ref 0–0)
PH UR STRIP.AUTO: 7 [PH]
PLATELET # BLD AUTO: 356 X10*3/UL (ref 150–450)
POTASSIUM SERPL-SCNC: 3.7 MMOL/L (ref 3.5–5.3)
PROT SERPL-MCNC: 7.5 G/DL (ref 6.4–8.2)
PROT UR STRIP.AUTO-MCNC: NEGATIVE MG/DL
RBC # BLD AUTO: 5.31 X10*6/UL (ref 4–5.2)
RBC # UR STRIP.AUTO: NEGATIVE /UL
RBC #/AREA URNS AUTO: ABNORMAL /HPF
SODIUM SERPL-SCNC: 138 MMOL/L (ref 136–145)
SP GR UR STRIP.AUTO: 1.01
TRIGL SERPL-MCNC: 88 MG/DL (ref 0–149)
UROBILINOGEN UR STRIP.AUTO-MCNC: <2 MG/DL
VLDL: 18 MG/DL (ref 0–40)
WBC # BLD AUTO: 5.9 X10*3/UL (ref 4.4–11.3)
WBC #/AREA URNS AUTO: ABNORMAL /HPF

## 2024-01-22 PROCEDURE — 3077F SYST BP >= 140 MM HG: CPT | Performed by: INTERNAL MEDICINE

## 2024-01-22 PROCEDURE — G0439 PPPS, SUBSEQ VISIT: HCPCS | Performed by: INTERNAL MEDICINE

## 2024-01-22 PROCEDURE — 1123F ACP DISCUSS/DSCN MKR DOCD: CPT | Performed by: INTERNAL MEDICINE

## 2024-01-22 PROCEDURE — 36415 COLL VENOUS BLD VENIPUNCTURE: CPT

## 2024-01-22 PROCEDURE — 99397 PER PM REEVAL EST PAT 65+ YR: CPT | Performed by: INTERNAL MEDICINE

## 2024-01-22 PROCEDURE — 85027 COMPLETE CBC AUTOMATED: CPT

## 2024-01-22 PROCEDURE — 80061 LIPID PANEL: CPT

## 2024-01-22 PROCEDURE — 80053 COMPREHEN METABOLIC PANEL: CPT

## 2024-01-22 PROCEDURE — 1160F RVW MEDS BY RX/DR IN RCRD: CPT | Performed by: INTERNAL MEDICINE

## 2024-01-22 PROCEDURE — 81001 URINALYSIS AUTO W/SCOPE: CPT

## 2024-01-22 PROCEDURE — 1170F FXNL STATUS ASSESSED: CPT | Performed by: INTERNAL MEDICINE

## 2024-01-22 PROCEDURE — 1159F MED LIST DOCD IN RCRD: CPT | Performed by: INTERNAL MEDICINE

## 2024-01-22 PROCEDURE — 3080F DIAST BP >= 90 MM HG: CPT | Performed by: INTERNAL MEDICINE

## 2024-01-22 PROCEDURE — 1126F AMNT PAIN NOTED NONE PRSNT: CPT | Performed by: INTERNAL MEDICINE

## 2024-01-22 PROCEDURE — 1036F TOBACCO NON-USER: CPT | Performed by: INTERNAL MEDICINE

## 2024-01-22 ASSESSMENT — ENCOUNTER SYMPTOMS
CONFUSION: 0
NECK STIFFNESS: 0
BRUISES/BLEEDS EASILY: 0
SPEECH DIFFICULTY: 0
DIARRHEA: 0
APNEA: 0
CHILLS: 0
NECK PAIN: 0
ADENOPATHY: 0
ARTHRALGIAS: 1
ANAL BLEEDING: 0
LOSS OF SENSATION IN FEET: 1
NAUSEA: 0
AGITATION: 0
PALPITATIONS: 0
FACIAL ASYMMETRY: 0
POLYPHAGIA: 0
BLOOD IN STOOL: 0
NERVOUS/ANXIOUS: 0
HALLUCINATIONS: 0
SHORTNESS OF BREATH: 0
HEMATURIA: 0
OCCASIONAL FEELINGS OF UNSTEADINESS: 0
FLANK PAIN: 0
RHINORRHEA: 0
EYE ITCHING: 0
DIFFICULTY URINATING: 0
SEIZURES: 0
ACTIVITY CHANGE: 0
WEAKNESS: 0
SLEEP DISTURBANCE: 0
COLOR CHANGE: 0
TREMORS: 0
VOICE CHANGE: 0
WHEEZING: 0
RECTAL PAIN: 0
CONSTIPATION: 0
TROUBLE SWALLOWING: 0
APPETITE CHANGE: 0
POLYDIPSIA: 0
DIAPHORESIS: 0
ABDOMINAL DISTENTION: 0
LIGHT-HEADEDNESS: 0
DYSPHORIC MOOD: 0
FEVER: 0
HEADACHES: 1
PHOTOPHOBIA: 0
FREQUENCY: 1
DECREASED CONCENTRATION: 0
NUMBNESS: 0
CHOKING: 0
DEPRESSION: 0
MYALGIAS: 0
DYSURIA: 0
COUGH: 0
FATIGUE: 0
EYE DISCHARGE: 0
SINUS PAIN: 0
BACK PAIN: 1
UNEXPECTED WEIGHT CHANGE: 0
VOMITING: 0
ABDOMINAL PAIN: 0
DIZZINESS: 0
HYPERACTIVE: 0
STRIDOR: 0
EYE PAIN: 0
FACIAL SWELLING: 0
EYE REDNESS: 0
SORE THROAT: 0
CHEST TIGHTNESS: 0
JOINT SWELLING: 0
WOUND: 0
SINUS PRESSURE: 0

## 2024-01-22 ASSESSMENT — PATIENT HEALTH QUESTIONNAIRE - PHQ9
SUM OF ALL RESPONSES TO PHQ9 QUESTIONS 1 AND 2: 0
1. LITTLE INTEREST OR PLEASURE IN DOING THINGS: NOT AT ALL
2. FEELING DOWN, DEPRESSED OR HOPELESS: NOT AT ALL

## 2024-01-22 ASSESSMENT — ACTIVITIES OF DAILY LIVING (ADL)
GROCERY_SHOPPING: INDEPENDENT
MANAGING_FINANCES: INDEPENDENT
TAKING_MEDICATION: INDEPENDENT
BATHING: INDEPENDENT
DOING_HOUSEWORK: INDEPENDENT
DRESSING: INDEPENDENT

## 2024-01-22 NOTE — PATIENT INSTRUCTIONS
Stop pravastatin to see if still needed; continue to focus on healthy diet and remain active as able  Please get a living will and DPOA of healthcare established and bring me copies of these results so we can have them on file and know your medical wishes   We did blood work today and will call with abnormal results  Call and schedule mammogram and bone density -orders are in (can be done here or at [facility])  Call PT and get back in to work on left sided low back pain  Continue all medications as directed-call when refills needed  Continue to work on healthy diet and exercise regularly as able  Follow up here in 6 months

## 2024-01-22 NOTE — PROGRESS NOTES
Subjective   Reason for Visit: Valerie Franklin is an 78 y.o. female here for a Medicare Wellness visit.     Past Medical, Surgical, and Family History reviewed and updated in chart.    Reviewed all medications by prescribing practitioner or clinical pharmacist (such as prescriptions, OTCs, herbal therapies and supplements) and documented in the medical record.    HPI  Pt here for MWV.      She fell recently when smashing cardboard boxes in her den.  She did fall back bumped her right elbow into the bookshelf.  She didn't tear any skin but did feel bruised.  She did not hit her head.      She is asking about stopping her statin. She would like trial off it and wants to see if still needed.      She has not taken her BP medications yet today.  Her BP is high.      She had colonoscopy in 2020 that was normal.  Repeat noted for 10 years.  She has some constipation but manages with prune juice and fiber gummies.      She had normal CMP/CBC in 9/2023 including ferritin.  She uses iron every other day.      Patient Care Team:  Alysia HOWE DO as PCP - General  Alysia HOWE DO as PCP - [employer] PCP     Review of Systems   Constitutional:  Negative for activity change, appetite change, chills, diaphoresis, fatigue, fever and unexpected weight change.   HENT:  Negative for congestion, dental problem, drooling, ear discharge, ear pain, facial swelling, hearing loss, mouth sores, nosebleeds, postnasal drip, rhinorrhea, sinus pressure, sinus pain, sneezing, sore throat, tinnitus, trouble swallowing and voice change.    Eyes:  Positive for visual disturbance (no recent exam). Negative for photophobia, pain, discharge, redness and itching.   Respiratory:  Negative for apnea, cough, choking, chest tightness, shortness of breath, wheezing and stridor.    Cardiovascular:  Negative for chest pain, palpitations and leg swelling.   Gastrointestinal:  Negative for abdominal distention, abdominal pain, anal bleeding,  "blood in stool, constipation, diarrhea, nausea, rectal pain and vomiting.   Endocrine: Negative for cold intolerance, heat intolerance, polydipsia, polyphagia and polyuria.   Genitourinary:  Positive for frequency. Negative for decreased urine volume, difficulty urinating, dyspareunia, dysuria, enuresis, flank pain, genital sores, hematuria, menstrual problem, pelvic pain, urgency, vaginal bleeding, vaginal discharge and vaginal pain.   Musculoskeletal:  Positive for arthralgias (left shoulder pain) and back pain. Negative for gait problem, joint swelling, myalgias, neck pain and neck stiffness.   Skin:  Negative for color change, pallor, rash and wound.   Allergic/Immunologic: Negative for environmental allergies, food allergies and immunocompromised state.   Neurological:  Positive for headaches. Negative for dizziness, tremors, seizures, syncope, facial asymmetry, speech difficulty, weakness, light-headedness and numbness.   Hematological:  Negative for adenopathy. Does not bruise/bleed easily.   Psychiatric/Behavioral:  Negative for agitation, behavioral problems, confusion, decreased concentration, dysphoric mood, hallucinations, self-injury, sleep disturbance and suicidal ideas. The patient is not nervous/anxious and is not hyperactive.        Objective   Vitals:  BP (!) 146/98 (BP Location: Left arm, Patient Position: Sitting)   Pulse 80   Resp 16   Ht 1.48 m (4' 10.25\")   Wt 57.7 kg (127 lb 3.2 oz)   BMI 26.36 kg/m²       Physical Exam  Constitutional:       Appearance: Normal appearance.   HENT:      Head: Normocephalic and atraumatic.      Right Ear: Tympanic membrane, ear canal and external ear normal. There is no impacted cerumen.      Left Ear: Tympanic membrane, ear canal and external ear normal. There is no impacted cerumen.      Nose: Nose normal. No congestion or rhinorrhea.      Mouth/Throat:      Mouth: Mucous membranes are moist.      Pharynx: Oropharynx is clear. No oropharyngeal exudate or " posterior oropharyngeal erythema.   Eyes:      Extraocular Movements: Extraocular movements intact.      Conjunctiva/sclera: Conjunctivae normal.      Pupils: Pupils are equal, round, and reactive to light.   Neck:      Vascular: No carotid bruit.   Cardiovascular:      Rate and Rhythm: Normal rate and regular rhythm.      Pulses: Normal pulses.      Heart sounds: Normal heart sounds. No murmur heard.  Pulmonary:      Effort: Pulmonary effort is normal. No respiratory distress.      Breath sounds: Normal breath sounds. No wheezing, rhonchi or rales.   Abdominal:      General: Abdomen is flat. Bowel sounds are normal. There is no distension.      Palpations: Abdomen is soft.      Tenderness: There is no abdominal tenderness.      Hernia: No hernia is present.   Musculoskeletal:         General: No swelling or tenderness. Normal range of motion.      Cervical back: Normal range of motion and neck supple.      Right lower leg: No edema.      Left lower leg: No edema.   Lymphadenopathy:      Cervical: No cervical adenopathy.   Skin:     General: Skin is warm and dry.      Findings: No lesion or rash.   Neurological:      General: No focal deficit present.      Mental Status: She is alert and oriented to person, place, and time.      Cranial Nerves: No cranial nerve deficit.      Sensory: No sensory deficit.      Motor: No weakness.   Psychiatric:         Mood and Affect: Mood normal.         Behavior: Behavior normal.         Thought Content: Thought content normal.         Judgment: Judgment normal.         Assessment/Plan   Problem List Items Addressed This Visit       GERD (gastroesophageal reflux disease)    Hypokalemia    Lumbar radiculopathy    Overactive bladder    Paresthesia of both feet    Arthralgia    Essential hypertension, benign     Other Visit Diagnoses       Medicare annual wellness visit, subsequent    -  Primary    Myalgia        Routine general medical examination at a health care facility

## 2024-01-22 NOTE — ASSESSMENT & PLAN NOTE
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

## 2024-01-22 NOTE — ASSESSMENT & PLAN NOTE
Bp high today; she admits to not taking her medication yet  Continue use and monitor  Low salt diet

## 2024-01-23 ENCOUNTER — TELEPHONE (OUTPATIENT)
Dept: PRIMARY CARE | Facility: CLINIC | Age: 79
End: 2024-01-23
Payer: MEDICARE

## 2024-01-23 DIAGNOSIS — N30.00 ACUTE CYSTITIS WITHOUT HEMATURIA: Primary | ICD-10-CM

## 2024-01-23 RX ORDER — NITROFURANTOIN 25; 75 MG/1; MG/1
100 CAPSULE ORAL 2 TIMES DAILY
Qty: 10 CAPSULE | Refills: 0 | Status: SHIPPED | OUTPATIENT
Start: 2024-01-23 | End: 2024-01-28

## 2024-01-23 NOTE — TELEPHONE ENCOUNTER
----- Message from Alysia HOWE DO sent at 1/23/2024  8:14 AM EST -----  Pts urine concerning for low grade infection; I am going to send in antibiotic for her to take due to these findings (macrobid twice a day for 5 days)

## 2024-01-29 ENCOUNTER — HOSPITAL ENCOUNTER (OUTPATIENT)
Dept: RADIOLOGY | Facility: CLINIC | Age: 79
Discharge: HOME | End: 2024-01-29
Payer: MEDICARE

## 2024-01-29 VITALS — WEIGHT: 126 LBS | HEIGHT: 58 IN | BODY MASS INDEX: 26.45 KG/M2

## 2024-01-29 DIAGNOSIS — Z78.0 POST-MENOPAUSE: ICD-10-CM

## 2024-01-29 DIAGNOSIS — Z12.31 ENCOUNTER FOR SCREENING MAMMOGRAM FOR MALIGNANT NEOPLASM OF BREAST: ICD-10-CM

## 2024-01-29 PROCEDURE — 77067 SCR MAMMO BI INCL CAD: CPT

## 2024-01-29 PROCEDURE — 77085 DXA BONE DENSITY AXL VRT FX: CPT | Performed by: RADIOLOGY

## 2024-01-29 PROCEDURE — 77085 DXA BONE DENSITY AXL VRT FX: CPT

## 2024-01-29 PROCEDURE — 77067 SCR MAMMO BI INCL CAD: CPT | Performed by: RADIOLOGY

## 2024-01-29 PROCEDURE — 77063 BREAST TOMOSYNTHESIS BI: CPT | Performed by: RADIOLOGY

## 2024-01-31 ENCOUNTER — EVALUATION (OUTPATIENT)
Dept: PHYSICAL THERAPY | Facility: CLINIC | Age: 79
End: 2024-01-31
Payer: MEDICARE

## 2024-01-31 DIAGNOSIS — M54.16 LUMBAR RADICULOPATHY: ICD-10-CM

## 2024-01-31 PROCEDURE — 97110 THERAPEUTIC EXERCISES: CPT | Mod: GP

## 2024-01-31 PROCEDURE — 97161 PT EVAL LOW COMPLEX 20 MIN: CPT | Mod: GP

## 2024-01-31 NOTE — PROGRESS NOTES
" Physical Therapy Evaluation    Patient Name Valerie Franklin  MRN: [id number]  Today's Date: 02/01/24  Time Calculation  Start Time: 1455  Stop Time: 1545  Time Calculation (min): 50 min    Assessment:  The pt presents with an exacerbation of chronic L sided lumbar and L LE pain.  She has functional limitations due to pain and decreased lumbar and LE ROM, flexibility and decreased core and hip strength. The pt also has balance limitations and decreased postural and body mechanics awareness.  The pt is an excellent candidate for skilled therapy to address the above listed limitations and to improve tolerance for functional activities.    Impression:  Skilled PT services will aid in advancing functional status and attaining therapy goals.    Problem List:  -activity limitations  -Functional limitations  -Pain low back/ L LE  -decreased  ROM  -decreased strength   -decreased flexibility  -posture awareness/ body mechanics  -decreased knowledge of HEP  -balance  -gait/ stair limitations    Goals:  STG: Pt (I) with initial HEP; By week 3; Goal     LTG Goals: 1/31/24  By week: 8-12    Pain: Decrease back pain to 3/10 or < 85% of the time or >, decrease L LE pain to 0/10 ; Goal     Posture: pt to demonstrate postural and body mechanics awareness ; Goal     Gait:  Pt to ambulate (I) without an assistive device on all surfaces, community distances, without limitation from pain ; Goal     Stairs: Pt to negotiate 10+ steps reciprocally (I) with 1 railing  ; Goal        Balance:  SLS = 10\" + ; Tandum = 25\" +  ; Goal     ROM: Increase lumbar ROM to WFL without limitation from pain. ;  Goal     Strength: Increase core and B LE strength to 5-/5 or > grossly for improved tolerance for functional activities. ; Goal     Flexibility: Improve flexibility of B LEs to WFL  ; Goal     Palpation: Decrease pain with palpation by 50% or > ; Goal    HEP: Pt to be (I) with HEP  ; Goal     Outcome Measures: Oswestry 5/50 or < Goal     Rehab " Potential: excellent    Plan:  Evaluation, Re-evaluation, Therapeutic Exercise, Therapeutic Activity; Neuromuscular reeducation; Postural Education; Body Mechanics; Home Exercise Program; Manual Techniques; Cold Pack; E-Stim; Ultrasound; Aquatics PRN, IMDN PRN; Gait/ Stair/ Balance Training    1-2 x week  until goals met or maximum rehab potential met  Pt is currently scheduled for n/a weeks    Plan of care was designed with input and agreement by the patient        Therapy Diagnoses:   1. Lumbar radiculopathy  Referral to Physical Therapy    Follow Up In Physical Therapy          General:  Reason for visit: L sided lumbar and LE pain   Referred by: Dr Alysia Mclain MD appt:  n/a  Preferred Name:  Valerie  Script:  tyleral and treat  Onset Date:  1/22/24    Insurance:  - [facility]  Copay $35, med [employer], 100%  -authorization required: yes      Subjective:    Current Episode of Functional Impairment and/or Pain : back and L LE pain. Has had pain for years and has had PT before. Pain does not go past the knee.  The pain has increased recently and is limiting tolerance for functional activities.    Pain     Pain assessment 0-10  Pain score: 8  Pain location: lumbar spine, L LE    Exacerbating Factors: Standing, walking  Relieving Factors: salonpas, uncers    Current Medical management:     PMHx: HTN; Neuropathy, HA, Ulcers, Bladder sx 22; Bunion sx, Carpal tunnel sx '05, '06     Medications for pain: n/a     Diagnostic Tests: x-ray T8-9 slight curvature    Functional Limitations:  Functional Limitations: Reaching, Lifting, Walking, and Standing    Home Living Situation: lives with grandson    Prior Level of Function: (I) with ambulation and ADLs    Patient Goal For Therapy: To decrease pain and increase tolerance for normal activities.     Occupation: retired    Hobbies: sudoku    Precautions:   HTN; Neuropathy, HA, Ulcers, Bladder sx 22; Bunion sx, Carpal tunnel sx '05, '06  Fall risk: moderate    Objective:    Pain:     "       Back pain average 8/10    Posture:           The pt had rounded shoulders, forward head and decreased lumbar lordosis. The pt has decreased postural and body mechanics awareness.    Gait:            The pt is (I) without an assistive device but only tolerates walking for 10 minutes    Stairs:           The pt is (I) with stairs with 1 railing    Balance:           Standing unsupported 2 minutes + (I);      ROM:           Lumbar flexion 10 \" fingers from floor                        extension 50 % feels good                        SB R 15\", L 17 \" pain fingers from floor                         Rotation R 20 % , L 20 %     Strength:           Abdominals 4-/5          Back extensors 4/5          The pt was able to toe and heel walk n/a          Hip flexors R/L 4+/4          Hip abductors R/L 4/4-          Hip adductors R/L 4+/4          Hip extensors R/L n/a          Quads R/L 5-/4+          Hams R/L 5-/4+          DF R/L 5/5          PF R/L 5/5          Great toe extensors R/L n/a    Flexibility:           Hamstrings R - 38 degrees, L - 32 degrees;          Gluteals R min , L trace restriction          Piriformis R min-mod , L min-mod restriction          Hip adductors R min , L mi          Quads R n/a, L n/a restriction          Hip flexors R n/a , L n/a restriction    Transfers:            Sit to stand (I) with armrests          Supine to sit (I)    Special tests:          Leg length: in supine L shorter ; in long sitting n/a ; SLR pulls in hamstring ; Slump n/a     Palpation:           Tenderness with palpation of B SI joints, L piriformis region       Ortho:  Outcome Measures:  Other Measures  Oswestry Disablity Index (LAURIE): 15/50       Treatment:  SKTC: 5\" x 5 each **  PPT: 5\" x 10 **    Education: Pt educated on dx, POC, anatomy, posture, ther ex technique and HEP  Preferred learning:  pictures, demonstration.  Demonstrated good understanding.      Access Code: [email or web address]  URL: " [email or web address]  Date: 01/31/2024  Prepared by: Parul Turner    Exercises  - Hooklying Single Knee to Chest Stretch  - 2 x daily - 7 x weekly - 1 sets - 10 reps - 5-10 hold  - Supine Posterior Pelvic Tilt  - 1-2 x daily - 7 x weekly - 10 reps - 5 hold

## 2024-02-05 ENCOUNTER — TREATMENT (OUTPATIENT)
Dept: PHYSICAL THERAPY | Facility: CLINIC | Age: 79
End: 2024-02-05
Payer: MEDICARE

## 2024-02-05 DIAGNOSIS — M54.16 LUMBAR RADICULOPATHY: ICD-10-CM

## 2024-02-05 PROCEDURE — 97110 THERAPEUTIC EXERCISES: CPT | Mod: GP,CQ

## 2024-02-05 NOTE — PROGRESS NOTES
"Physical Therapy  Physical Therapy Progress Note    Patient Name Valerie Franklin   MRN: [id number]  Today's Date: 02/05/24  Time Calculation  Start Time: 0245  Stop Time: 0325  Time Calculation (min): 40 min    Insurance:    - [facility]  -Copay $35, med neces, 100%  -authorization required: yes  -6V  authorized 1/31/24 to 3/30/24     Therapy Diagnoses:   1. Lumbar radiculopathy  Follow Up In Physical Therapy          General:  Reason for visit: L sided lumbar and LE pain   Referred by: Dr Alysia Mclain MD appt:  n/a  Preferred Name:  Valerie  Script:  eval and treat  Onset Date:  1/22/24     Assessment:    Reviewed benefit and purpose of each exercise with pt.  Also instructed pt. to perform exercises gently vs. Aggressive. Reporting better understanding.  Patient tolerated treatment well, did well with progression this date.    Updated HEP, also issued green tband   Patient needs continued work on/skilled PT for:  strengthening to address remaining functional, objective and subjective deficits to allow them to return to prior /optimal level of function with ADLs.  Patient is progressing with goals: decrease pain/ increase flexibility   Skilled care:  exercise progression    Plan:    Continue to progress per poc:   NV standing stabilization    Subjective:   Patient reports:  She was very sore after the initial session with the exercises though has done them a second time and is not as sore, also not sure she understands one of them.    Have you fallen since last visit:  no    Precautions:    HTN; Neuropathy, HA, Ulcers, Bladder sx 22; Bunion sx, Carpal tunnel sx '05, '06  Fall risk: moderate       Pain:  3-5/10  Location/Type of pain:  lumbar spine, L LE     HEP compliance/understanding:  yes      Treatment:   **= HEP  NV= Next visit  np= not performed  nb= non-billable  G= group HEP= discharged to [facility]  Therapeutic Exercise:     40 minutes  Nu step L3 7' (warm up while taking subjective )  PPT 5\" 10x **  SKC 5\" 10x " **  Supine hamstring stretch 30 sec 3x **  Supine piriformis stretch 30 sec 3x **  Hooklying hip abduction green tband 2 x 10 **      Manual Therapy:       minutes      Neuromuscular Re-education:      minutes        Modalities:       Vasopneumatic Device       minutes  Electrical Stimulation          minutes  Ultrasound            minutes  Iontophoresis                     minutes  Cold Pack            minutes  Mechanical Traction           minutes          Education:  exercise progression  HEP Progression:    Access Code: [email or web address]  URL: [email or web address]  Date: 02/05/2024  Prepared by: Yodit    Exercises  - Hooklying Single Knee to Chest Stretch  - 2 x daily - 7 x weekly - 1 sets - 10 reps - 5-10 hold  - Supine Posterior Pelvic Tilt  - 1-2 x daily - 7 x weekly - 10 reps - 5 hold  - Supine Hamstring Stretch with Strap  - 1 x daily - 7 x weekly - 1 sets - 3 reps - 30 hold  - Supine Piriformis Stretch with Leg Straight  - 1 x daily - 7 x weekly - 1 sets - 3 reps - 30 hold  - Hooklying Clamshell with Resistance  - 1 x daily - 7 x weekly - 2 sets - 10 reps - 5 hold

## 2024-02-07 ENCOUNTER — TREATMENT (OUTPATIENT)
Dept: PHYSICAL THERAPY | Facility: CLINIC | Age: 79
End: 2024-02-07
Payer: MEDICARE

## 2024-02-07 DIAGNOSIS — M54.16 LUMBAR RADICULOPATHY: ICD-10-CM

## 2024-02-07 PROCEDURE — 97110 THERAPEUTIC EXERCISES: CPT | Mod: GP

## 2024-02-07 NOTE — PROGRESS NOTES
Patient Name Valerie Franklin   MRN: [id number]  Today's Date: 02/07/24  Time Calculation  Start Time: 1405  Stop Time: 1450  Time Calculation (min): 45 min    Insurance:    (per information provided by  pre-cert team)  Visit #: 3  - [facility]  -Copay $35, med neces, 100%  -authorization required: yes  -6V  authorized 1/31/24 to 3/30/24    Therapy Diagnoses:   1. Lumbar radiculopathy  Follow Up In Physical Therapy          General:  Reason for visit: L sided lumbar and LE pain   Referred by: Dr Alysia Mclain MD appt:  n/a  Preferred Name:  Valerie  Script:  eval and treat  Onset Date:  1/22/24  Last re-assessment date:  n/a  Patient's Email: n/a    Assessment:  Patient tolerated treatment well, did well with progression this date.  She did not have any radicular symptoms and had overall decreased back pain with the exercises done this session.  The pt demonstrated improved contraction of the transverse abdominus muscle  Patient needs continued work on/skilled PT for: ROM, flexibility and core strength to address remaining functional, objective and subjective deficits to allow them to return to prior /optimal level of function with ADLs.  Patient is progressing with goals: pain reduction, ROM, flexibility, strength, posture  Skilled care:  therapeutic exercise, NMR, pt education    Plan:    Continue to progress per poc:   NV add hip strengthening exercises    Subjective:   Patient reports:  she has been tolerating the HEP well. She has some back pain today but no leg pain    Have you fallen since last visit:  no    Precautions: moderate fall risk  HTN; Neuropathy, HA, Ulcers, Bladder sx 22; Bunion sx, Carpal tunnel sx '05, '06     Pain:  no number given/10  Location/Type of pain:  low back/ L LE    HEP compliance/understanding:  yes    Objective:   Objective Measurements:  n/a  Treatment:   **= HEP  NV= Next visit  np= not performed  nb= non-billable  G= group HEP= discharged to HEP      Therapeutic Exercise:      "45 minutes  Nu step L3 7' (warm up while taking subjective )  Stair hamstring stretch: 20\" x 3 each  Seated trunk flexion: x 10**  Seated PPT: 5\" x 10 **  Seated Diaphragmatic breathing  PPT 5\" 10x **  PPT with hip add: 5\" x 10   SKC 5\" 10x **  Trunk rotation: 305 B x 10 each  Supine hamstring stretch 30 sec 3x ** NP  Supine piriformis stretch 30 sec 3x ** NP  Hooklying hip abduction green tband 2 x 10 ** NP       Manual Therapy:       minutes      Neuromuscular Re-education:      minutes      Modalities:       Vasopneumatic Device       minutes  Electrical Stimulation          minutes  Ultrasound            minutes  Iontophoresis                     minutes  Cold Pack            minutes  Mechanical Traction           minutes    Education:    Pt ed on therapeutic exercise, HEP, posture, diaphragmatic breathing  HEP Progression:    Access Code: [email or web address]  URL: [email or web address]  Date: 02/07/2024  Prepared by: Parul Turner    Exercises  - Seated Lumbar Flexion Stretch  - 3-5 x daily - 7 x weekly - 1-2 sets - 10 reps - 3-5 hold  - Seated Pelvic Tilt  - 1-2 x daily - 7 x weekly - 1-2 sets - 10 reps - 5 hold     "

## 2024-02-08 DIAGNOSIS — I10 PRIMARY HYPERTENSION: ICD-10-CM

## 2024-02-08 RX ORDER — CLONIDINE HYDROCHLORIDE 0.1 MG/1
0.1 TABLET ORAL 2 TIMES DAILY
Qty: 180 TABLET | Refills: 1 | Status: SHIPPED | OUTPATIENT
Start: 2024-02-08

## 2024-02-12 ENCOUNTER — APPOINTMENT (OUTPATIENT)
Dept: OBSTETRICS AND GYNECOLOGY | Facility: CLINIC | Age: 79
End: 2024-02-12
Payer: MEDICARE

## 2024-02-14 ENCOUNTER — TREATMENT (OUTPATIENT)
Dept: PHYSICAL THERAPY | Facility: CLINIC | Age: 79
End: 2024-02-14
Payer: MEDICARE

## 2024-02-14 DIAGNOSIS — M54.16 LUMBAR RADICULOPATHY: ICD-10-CM

## 2024-02-14 PROCEDURE — 97110 THERAPEUTIC EXERCISES: CPT | Mod: GP

## 2024-02-14 NOTE — PROGRESS NOTES
Patient Name Vlaerie Franklin   MRN: [id number]  Today's Date: 02/14/24  Time Calculation  Start Time: 1320  Stop Time: 1408  Time Calculation (min): 48 min    Insurance:    (per information provided by  pre-cert team)  Visit #: 4  - [facility]  -Copay $35, med neces, 100%  -authorization required: yes  -6V  authorized 1/31/24 to 3/30/24    Therapy Diagnoses:   1. Lumbar radiculopathy  Follow Up In Physical Therapy          General:  Reason for visit: L sided lumbar and LE pain   Referred by: Dr Alysia Mclain MD appt:  n/a  Preferred Name:  Valerie  Script:  eval and treat  Onset Date:  1/22/24  Last re-assessment date:  n/a  Patient's Email: n/a    Assessment:  Patient tolerated treatment well, her pain level was decreased to 1/10 at the end of the session.  She did have tenderness that was very specific to palpation of the L SI joint.  She was advised to focus on SKTC and piriformis stretch at home as tolerated.   Patient needs continued work on/skilled PT for: ROM, flexibility and core strength to address remaining functional, objective and subjective deficits to allow them to return to prior /optimal level of function with ADLs.  Patient is progressing with goals: pain reduction, ROM, flexibility, strength, posture  Skilled care:  therapeutic exercise, NMR, pt education    Plan:    Continue to progress per poc:   NV add hip strengthening exercises  NV assess SI joint and leg length.    Subjective:   Patient reports:  she is sore all over today.  She has pain in the low back and gluteal region L > R, but it is not radiating into the L LE. She reports she did the HEP once and she has some muscle and joint soreness from that.     Have you fallen since last visit:  no    Precautions: moderate fall risk  HTN; Neuropathy, HA, Ulcers, Bladder sx 22; Bunion sx, Carpal tunnel sx '05, '06     Pain:  7/10  Location/Type of pain:  low back/ L LE    HEP compliance/understanding:  yes    Objective:   Objective Measurements:   "n/a  Treatment:   **= HEP  NV= Next visit  np= not performed  nb= non-billable  G= group HEP= discharged to HEP      Therapeutic Exercise:     43 minutes  Nu step L3 7' (warm up while taking subjective )  Stair hamstring stretch: 20\" x 3 each  Stair gastroc stretch: 20\" x 3 each  Seated trunk flexion: x 10** NP  Seated PPT: 5\" x 10 ** NP  Seated Diaphragmatic breathing NP  Supine Diaphragmatic breathing x 5 cycles  PPT 5\" 10x **  PPT with hip add: 5\" x 10   SKC 5\" 5x each **  Trunk rotation: B x 10 each  Supine hamstring dural stretch: 5\" x 10 each  Supine piriformis stretch 20 sec 3x **   Hooklying hip abduction green tband 2 x 10 **        Manual Therapy:       minutes      Neuromuscular Re-education:    5 minutes  DLS pulldowns: yellow 10 x 2     Modalities:       Vasopneumatic Device       minutes  Electrical Stimulation          minutes  Ultrasound            minutes  Iontophoresis                     minutes  Cold Pack            minutes  Mechanical Traction           minutes    Education:    Pt ed on therapeutic exercise, HEP, posture, diaphragmatic breathing  HEP Progression:    "

## 2024-02-19 ENCOUNTER — TREATMENT (OUTPATIENT)
Dept: PHYSICAL THERAPY | Facility: CLINIC | Age: 79
End: 2024-02-19
Payer: MEDICARE

## 2024-02-19 DIAGNOSIS — M54.16 LUMBAR RADICULOPATHY: ICD-10-CM

## 2024-02-19 PROCEDURE — 97110 THERAPEUTIC EXERCISES: CPT | Mod: GP,CQ

## 2024-02-19 NOTE — PROGRESS NOTES
Patient Name Valerie Franklin   MRN: [id number]  Today's Date: 02/19/24  Time Calculation  Start Time: 0200  Stop Time: 0245  Time Calculation (min): 45 min    Insurance:    (per information provided by  pre-cert team)  Visit #: 5  - [facility]  -Copay $35, med neces, 100%  -authorization required: yes  -6V  authorized 1/31/24 to 3/30/24    Therapy Diagnoses:   1. Lumbar radiculopathy  Follow Up In Physical Therapy          General:  Reason for visit: L sided lumbar and LE pain   Referred by: Dr Alysia Mclain MD appt:  n/a  Preferred Name:  Valerie  Script:  eval and treat  Onset Date:  1/22/24  Last re-assessment date:  n/a  Patient's Email: n/a    Assessment:  Patient tolerated treatment well, her pain level was decreased to 2/10 at the end of the session.  She continue to have tenderness that was very specific to palpation of the L SI joint.  She was reinstructed to perform  SKTC and piriformis stretch gently.  Had some increased pain moving from supine to sitting , needed to perform seated flex stretch and pain decreased to 2 post rx.   Patient needs continued work on/skilled PT for: ROM, flexibility and core strength to address remaining functional, objective and subjective deficits to allow them to return to prior /optimal level of function with ADLs.  Patient is progressing with goals: pain reduction, ROM, flexibility, strength, posture  Skilled care:  therapeutic exercise, NMR, pt education    Plan:    Continue to progress per poc:   NV progress hip strengthening exercises  NV continue to assess SI joint and leg length.    Subjective:   Patient reports:  she is defines her pain as more of an ache though pain level still a 7.  Out and about walking causes more pain on the L.  She reports she did the HEP daily but the one pulling across causes more pain on the R. Has not been doing the breathing exercises .  Reports her friends advised her to buy biofreeeze, asking questions about this.    Have you fallen  "since last visit:  no    Precautions: moderate fall risk  HTN; Neuropathy, HA, Ulcers, Bladder sx 22; Bunion sx, Carpal tunnel sx '05, '06     Pain:  7/10  Location/Type of pain:  low back/ L LE    HEP compliance/understanding:  yes    Objective:   Objective Measurements:  n/a  Treatment:   **= HEP  NV= Next visit  np= not performed  nb= non-billable  G= group HEP= discharged to HEP      Therapeutic Exercise:     45 minutes  Nu step L3 7' (warm up while taking subjective )  Stair hamstring stretch: 20\" x 3 each  Stair gastroc stretch: 20\" x 3 each  Seated trunk flexion: x 10**  w/ green swiss ball  SB 10 sec 5x each way   Seated PPT: 5\" x 10 ** NP  Seated Diaphragmatic breathing NP  Supine Diaphragmatic breathing x 5 cycles reinstructed   PPT 5\" 10x **  PPT with hip add: 5\" x 10   SKC 5\" 5x each **  Trunk rotation: B x 10 each  Supine hamstring dural stretch: 4x  Supine piriformis stretch 20 sec 3x **   Hooklying hip abduction green tband 2 x 10 **   Hooklying green tband marching 2 x 10    Reviewed use of biofreeze.     Manual Therapy:       minutes      Neuromuscular Re-education:      minutes  DLS pulldowns: yellow 10 x 2 NP    Modalities:       Vasopneumatic Device       minutes  Electrical Stimulation          minutes  Ultrasound            minutes  Iontophoresis                     minutes  Cold Pack            minutes  Mechanical Traction           minutes    Education:    Pt ed on therapeutic exercise, HEP, posture, diaphragmatic breathing  HEP Progression:    "

## 2024-02-21 ENCOUNTER — TREATMENT (OUTPATIENT)
Dept: PHYSICAL THERAPY | Facility: CLINIC | Age: 79
End: 2024-02-21
Payer: MEDICARE

## 2024-02-21 DIAGNOSIS — M54.16 LUMBAR RADICULOPATHY: ICD-10-CM

## 2024-02-21 PROCEDURE — 97112 NEUROMUSCULAR REEDUCATION: CPT | Mod: GP

## 2024-02-21 PROCEDURE — 97110 THERAPEUTIC EXERCISES: CPT | Mod: GP

## 2024-02-21 NOTE — PROGRESS NOTES
Patient Name Valerie Franklin   MRN: [id number]  Today's Date: 02/21/24  Time Calculation  Start Time: 1450  Stop Time: 1545  Time Calculation (min): 55 min    Insurance:    (per information provided by  pre-cert team)  Visit #: 6  - [facility]  -Copay $35, med neces, 100%  -authorization required: yes  -6V  authorized 1/31/24 to 3/30/24    Therapy Diagnoses:   1. Lumbar radiculopathy  Follow Up In Physical Therapy          General:  Reason for visit: L sided lumbar and LE pain   Referred by: Dr Alysia Mclain MD appt:  n/a  Preferred Name:  Valerie  Script:  eval and treat  Onset Date:  1/22/24  Last re-assessment date:  n/a  Patient's Email: n/a    Assessment:  The pt is progressing towards goals (see objective section), but still has limitations from pain and limited ROM, flexibility, and strength.  The pt is tender with palpation in the L SI/piriformis region and has decreased flexibility and strength in this area.  The pt would benefit from additional therapy sessions to continue to address pain and functional limitations.   Patient needs continued work on/skilled PT for: ROM, flexibility and core/hip strength to address remaining functional, objective and subjective deficits to allow them to return to prior /optimal level of function with ADLs.  Patient is progressing with goals: pain reduction, ROM, flexibility, strength, posture  Skilled care:  therapeutic exercise, NMR, pt education, re-evaluation    Goals:  STG: Pt (I) with initial HEP; By week 3; Goal MET     LTG Goals: 2/21/24  By week: 8-12     Pain: Decrease back pain to 3/10 or < 85% of the time or >, decrease L LE pain to 0/10 ; Goal PROGRESSING     Posture: pt to demonstrate postural and body mechanics awareness ; Goal PROGRESSING     Gait:  Pt to ambulate (I) without an assistive device on all surfaces, community distances, without limitation from pain ; Goal PROGRESSING     Stairs: Pt to negotiate 10+ steps reciprocally (I) with 1 railing  ; Goal   "  N/A     Balance:  SLS = 10\" + ; Tandum = 25\" +  ; Goal N/A     ROM: Increase lumbar ROM to WFL without limitation from pain. ;  Goal PROGRESSING     Strength: Increase core and B LE strength to 5-/5 or > grossly for improved tolerance for functional activities. ; Goal PROGRESSING     Flexibility: Improve flexibility of B LEs to WFL  ; Goal PROGRESSING     Palpation: Decrease pain with palpation by 50% or > ; Goal PROGRESSING     HEP: Pt to be (I) with HEP  ; Goal PROGRESSING     Outcome Measures: Oswestry 5/50 or < Goal N/A       Plan:    Continue 1-2x/wk for 4-5 more weeks  Continue to progress per poc:   NV add hip strengthening exercises  NV assess SI joint and leg length.    Subjective:   Patient reports:  she had a lot of pain this morning.  Her pain level has decreased overall but she still has times where her pain level goes up to 8-9/10.    Have you fallen since last visit:  no    Precautions: moderate fall risk  HTN; Neuropathy, HA, Ulcers, Bladder sx 22; Bunion sx, Carpal tunnel sx '05, '06     Pain:  5/10, this morning 8-9  Location/Type of pain:  low back/ L LE    HEP compliance/understanding:  yes    Objective:   Objective Measurements:    Pain:           Back pain average 8/10 ; average 5/10, but can still go up to 8-9/10 and at times down to 2/10.     Posture:           The pt had rounded shoulders, forward head and decreased lumbar lordosis. The pt has decreased postural and body mechanics awareness. ;  Improved postural awareness, pt still needs more education on body mechanics     Gait:            The pt is (I) without an assistive device but only tolerates walking for 10 minutes ;  Slight improvement in walking tolerance     Stairs:           The pt is (I) with stairs with 1 railing     Balance:           Standing unsupported 2 minutes + (I);  n/a     ROM:           Lumbar flexion 10 \" fingers from floor ; 10\"                         extension 50 % feels good ; n/a                        SB R " "15\", L 17 \" pain fingers from floor ; R 17\" , L 18\"                        Rotation R 20 % , L 20 %  ; R 70% pain R, L 70% pain L     Strength:           Abdominals 4-/5 ; 4          Back extensors 4/5 ; 4+           The pt was able to toe and heel walk n/a          Hip flexors R/L 4+/4 ; 5-/4+          Hip abductors R/L 4/4- ; 4/4-          Hip adductors R/L 4+/4 ; 4+/4          Hip extensors R/L n/a ; 4-/3+          Quads R/L 5-/4+ ; 5-/5-          Hams R/L 5-/4+ ; 5-/5-          DF R/L 5/5          PF R/L 5/5          Great toe extensors R/L n/a     Flexibility:           Hamstrings R - 38 degrees, L - 32 degrees; R -29 , L -28          Gluteals R min , L trace restriction ; R min , L WNL          Piriformis R min-mod , L min-mod restriction ; R WNL , L min          Hip adductors R min , L min ; R min, L min          Quads R n/a, L n/a restriction          Hip flexors R n/a , L n/a restriction     Transfers:            Sit to stand (I) with armrests          Supine to sit (I)     Special tests:          Leg length: in supine L shorter ; in long sitting n/a ; SLR pulls in hamstring ; Slump n/a ; supine L ~1cm longer, SLR improved but still tighter on L compared to R     Palpation:           Tenderness with palpation of B SI joints, L piriformis region ; the pt continues to have a lot of tenderness in the L > R SI/ piriformis region        Ortho:  Outcome Measures:  Other Measures  Oswestry Disablity Index (LAURIE): 15/50  Treatment:   **= HEP  NV= Next visit  np= not performed  nb= non-billable  G= group HEP= discharged to [facility]      Therapeutic Exercise:     45 minutes  Re-eval  Nu step L3 10' (warm up while taking subjective )  Stair hamstring stretch: 20\" x 3 each  Stair gastroc stretch: 20\" x 3 each  Seated trunk flexion: x 10** NP  Seated PPT: 5\" x 10 ** NP  Seated Diaphragmatic breathing NP  Supine Diaphragmatic breathing x 5 cycles NP  PPT 5\" 10x **  PPT with hip add: 5\" x 10   SKC 5\" 5x each **  Trunk rotation: " "B x 5 each  Supine hamstring dural stretch: 5\" x 10 each NP  Supine piriformis stretch 20 sec 3x ** NP  Hooklying hip abduction green tband 2 x 10 ** NP   Supine glut sets: 5\" x 10 **  Bridges: 5\" x 5 **    Manual Therapy:       minutes      Neuromuscular Re-education:    10 minutes  DLS pulldowns: red 10 x 2   T-band rows: red 10 x 2    Modalities:       Vasopneumatic Device       minutes  Electrical Stimulation          minutes  Ultrasound            minutes  Iontophoresis                     minutes  Cold Pack            minutes  Mechanical Traction           minutes    Education:    Pt ed on therapeutic exercise, HEP, posture,   Pt ed on progression towards goals and POC  HEP Progression:    Access Code: [email or web address]  URL: [email or web address]  Date: 02/21/2024  Prepared by: Parul Turner    Exercises  - Supine Gluteal Sets  - 1-2 x daily - 7 x weekly - 1-2 sets - 10 reps - 5 hold  - Supine Bridge  - 1 x daily - 7 x weekly - 1-2 sets - 10 reps - 3-5 hold  "

## 2024-02-25 ENCOUNTER — APPOINTMENT (OUTPATIENT)
Dept: RADIOLOGY | Facility: HOSPITAL | Age: 79
End: 2024-02-25
Payer: MEDICARE

## 2024-02-25 ENCOUNTER — HOSPITAL ENCOUNTER (EMERGENCY)
Facility: HOSPITAL | Age: 79
Discharge: HOME | End: 2024-02-25
Payer: MEDICARE

## 2024-02-25 VITALS
HEART RATE: 64 BPM | DIASTOLIC BLOOD PRESSURE: 92 MMHG | OXYGEN SATURATION: 97 % | RESPIRATION RATE: 16 BRPM | WEIGHT: 126 LBS | SYSTOLIC BLOOD PRESSURE: 166 MMHG | BODY MASS INDEX: 26.45 KG/M2 | HEIGHT: 58 IN | TEMPERATURE: 97.7 F

## 2024-02-25 DIAGNOSIS — S80.02XA CONTUSION OF LEFT KNEE, INITIAL ENCOUNTER: ICD-10-CM

## 2024-02-25 DIAGNOSIS — S00.81XA ABRASION OF FACE, INITIAL ENCOUNTER: ICD-10-CM

## 2024-02-25 DIAGNOSIS — S92.902A CLOSED FRACTURE OF LEFT FOOT, INITIAL ENCOUNTER: ICD-10-CM
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
## 2024-02-25 DIAGNOSIS — S09.90XA CLOSED HEAD INJURY, INITIAL ENCOUNTER: ICD-10-CM

## 2024-02-25 PROCEDURE — 29405 APPL SHORT LEG CAST: CPT | Performed by: NURSE PRACTITIONER

## 2024-02-25 PROCEDURE — 73630 X-RAY EXAM OF FOOT: CPT | Mod: LEFT SIDE | Performed by: RADIOLOGY

## 2024-02-25 PROCEDURE — 99284 EMERGENCY DEPT VISIT MOD MDM: CPT | Mod: 25

## 2024-02-25 PROCEDURE — 73630 X-RAY EXAM OF FOOT: CPT | Mod: LT

## 2024-02-25 PROCEDURE — 73564 X-RAY EXAM KNEE 4 OR MORE: CPT | Mod: RIGHT SIDE | Performed by: RADIOLOGY

## 2024-02-25 PROCEDURE — 73564 X-RAY EXAM KNEE 4 OR MORE: CPT | Mod: RT

## 2024-02-25 ASSESSMENT — PAIN SCALES - GENERAL: PAINLEVEL_OUTOF10: 6

## 2024-02-25 ASSESSMENT — COLUMBIA-SUICIDE SEVERITY RATING SCALE - C-SSRS
1. IN THE PAST MONTH, HAVE YOU WISHED YOU WERE DEAD OR WISHED YOU COULD GO TO SLEEP AND NOT WAKE UP?: NO
6. HAVE YOU EVER DONE ANYTHING, STARTED TO DO ANYTHING, OR PREPARED TO DO ANYTHING TO END YOUR LIFE?: NO
2. HAVE YOU ACTUALLY HAD ANY THOUGHTS OF KILLING YOURSELF?: NO

## 2024-02-25 ASSESSMENT — PAIN - FUNCTIONAL ASSESSMENT: PAIN_FUNCTIONAL_ASSESSMENT: 0-10

## 2024-02-25 NOTE — ED PROVIDER NOTES
HPI   Chief Complaint   Patient presents with    Foot Injury     Left foot       Patient is a 78-year-old female with past medical history of hypertension, cystocele, who presents to the ED today due to mechanical fall.  Patient states she hit her foot on a curb and fell striking her face.  Patient is not on blood thinners and did not lose consciousness.  Patient denies any neck pain.  Patient is complaining of pain along the lateral aspect of her left foot.  Patient also complains of left knee pain.  Patient has baseline neuropathy but no new numbness or weakness in the foot.      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/2015    Meningioma    Bunion of unspecified foot     Bunion    Candidiasis, unspecified 02/10/2021    Antibiotic-induced yeast infection    COVID-19 11/03/2022    COVID    Cystocele, midline     Cystocele, midline    Encounter for other preprocedural examination 06/21/2017    Preoperative clearance    Essential (primary) hypertension     Hypertension, benign    Other abnormal and inconclusive findings on diagnostic imaging of breast 03/25/2016    Abnormal mammogram    Other general symptoms and signs 05/26/2020    Cold feeling    Other specified abnormal findings of blood chemistry 11/13/2020    Elevated TSH    Personal history of other diseases of the circulatory system 03/27/2015    History of hypertension    Personal history of other diseases of the female genital tract 10/21/2021    History of postmenopausal bleeding    Personal history of other diseases of the nervous system and sense organs     History of carpal tunnel syndrome    Personal history of other diseases of urinary system 06/27/2019    History of hematuria    Personal history of other diseases of urinary system 02/10/2021     History of pyelonephritis    Personal history of other endocrine, nutritional and metabolic disease     History of high cholesterol    Personal history of other specified conditions 2020    History of abdominal pain    Personal history of urinary (tract) infections 2021    History of urinary tract infection    Pyelonephritis 2023    Unspecified abnormal findings in urine 2020    Malodorous urine    Urinary tract infection, site not specified 10/23/2020    Acute lower UTI    Urinary tract infection, site not specified 2021    Acute UTI     Past Surgical History:   Procedure Laterality Date    BLADDER SUSPENSION      CATARACT EXTRACTION Bilateral 2017    Cataract Surgery    OTHER SURGICAL HISTORY Bilateral 2020    Carpal tunnel surgery    OTHER SURGICAL HISTORY Bilateral 2020    Bunionectomy     Family History   Problem Relation Name Age of Onset    Hypertension Daughter      Hypertension Son      Hypertension Grandchild       Social History     Tobacco Use    Smoking status: Former     Packs/day: 1.00     Years: 30.00     Additional pack years: 0.00     Total pack years: 30.00     Types: Cigarettes     Quit date:      Years since quittin.    Smokeless tobacco: Never   Vaping Use    Vaping Use: Never used   Substance Use Topics    Alcohol use: Not Currently     Alcohol/week: 1.0 - 2.0 standard drink of alcohol     Types: 1 - 2 Standard drinks or equivalent per week     Comment: 1-2 cocktails/night    Drug use: Never       Physical Exam   ED Triage Vitals [24 1238]   Temperature Heart Rate Respirations BP   36.5 °C (97.7 °F) 64 16 (!) 166/92      Pulse Ox Temp src Heart Rate Source Patient Position   97 % -- -- --      BP Location FiO2 (%)     -- --       Physical Exam  Vitals and nursing note reviewed.   Constitutional:       General: She is not in acute distress.     Appearance: She is well-developed.   HENT:      Head: Normocephalic. Abrasion present.         Comments: Abrasion to the noted area,     Mouth/Throat:      Mouth: Mucous membranes are moist.      Dentition: Normal dentition.   Eyes:      Conjunctiva/sclera: Conjunctivae normal.   Cardiovascular:      Rate and Rhythm: Normal rate and regular rhythm.      Heart sounds: No murmur heard.  Pulmonary:      Effort: Pulmonary effort is normal. No respiratory distress.      Breath sounds: Normal breath sounds.   Abdominal:      Palpations: Abdomen is soft.      Tenderness: There is no abdominal tenderness.   Musculoskeletal:         General: No swelling.      Cervical back: Neck supple.      Right knee: Normal.      Left knee: No bony tenderness. Tenderness present.      Right lower leg: Normal.      Left lower leg: Normal.      Right ankle: Normal. No tenderness. Normal range of motion.      Right Achilles Tendon: Normal.      Left ankle: Normal. No tenderness. Normal range of motion.      Left Achilles Tendon: Normal.      Right foot: Normal.      Left foot: Normal range of motion and normal capillary refill. Bony tenderness (Proximal fifth metatarsal) present. No swelling. Normal pulse.   Skin:     General: Skin is warm and dry.      Capillary Refill: Capillary refill takes less than 2 seconds.   Neurological:      Mental Status: She is alert.   Psychiatric:         Mood and Affect: Mood normal.         ED Course & MDM   ED Course as of 02/25/24 1457   Sun Feb 25, 2024   1416 XR knee right 4+ views  Degenerative changes of the knee without acute osseous abnormality  detected.   [WS]   1416 XR foot left 3+ views  Acute transversely oriented fracture at the base of the 5th metatarsal.   [WS]      ED Course User Index  [WS] Ruperto Castro, JYOTI-CNP         Diagnoses as of 02/25/24 1457   Fall, initial encounter   Closed head injury, initial encounter   Abrasion of face, initial encounter   Contusion of left knee, initial encounter   Closed fracture of left foot, initial encounter       Medical Decision  Making  Differential diagnosis: Knee contusion, knee sprain, closed head injury, Facial abrasion, foot fracture, foot contusion.  Patient's vital signs been stable throughout her visit in the emergency department.  Patient was recommended to have CT of the head, face, and neck.  Patient declined these exams.  She is alert and oriented x 3 and has capacity.  I discussed the risks of this and she verbalized understanding and still is on the exams.  Patient's x-ray of the knee was negative for acute findings but does have degenerative changes.  Patient's x-ray of the foot does show a transverse fracture of the base of the fifth metatarsal.  Patient was placed in a splint and given a walker.  Patient is to be nonweightbearing until she follows up with orthopedics.  Splint was placed by me, see procedure note.  RICE Therapy discussed with patient/parent.  Post splint care discussed.  Patient neurovascular status maintained after splint application.  If numbness, tingling, coolness, severe pain in the extremity, or delayed capillary refill occur, return to ED immediately for evaluation.    I discussed the differential, results and discharge plan with the patient.  I emphasized the importance of follow-up with the physician I referred them to in the timeframe recommended.  I explained reasons for the them to return to the Emergency Department. Additional verbal discharge instructions were also given and discussed with them to supplement those generated by the EMR. We also discussed medications that were prescribed (if any) including common side effects and interactions. All questions were addressed.  They understand return precautions and discharge instructions. They expressed understanding.          Amount and/or Complexity of Data Reviewed  Radiology: ordered and independent interpretation performed. Decision-making details documented in ED Course.    Risk  OTC drugs.        Procedure  Splint Application    Performed by:  NATHAN Mauro  Authorized by: NATHAN Mauro    Consent:     Consent obtained:  Verbal    Consent given by:  Patient    Risks, benefits, and alternatives were discussed: yes      Risks discussed:  Discoloration, numbness, pain and swelling    Alternatives discussed:  No treatment, delayed treatment, alternative treatment, observation and referral  Universal protocol:     Procedure explained and questions answered to patient or proxy's satisfaction: yes      Relevant documents present and verified: yes      Test results available: yes      Imaging studies available: yes      Required blood products, implants, devices, and special equipment available: yes      Immediately prior to procedure a time out was called: yes      Patient identity confirmed:  Verbally with patient and arm band  Pre-procedure details:     Distal neurologic exam:  Normal    Distal perfusion: distal pulses strong and brisk capillary refill    Procedure details:     Location:  Foot    Foot location:  L foot    Cast type:  Short leg    Supplies:  Fiberglass    Attestation: Splint applied and adjusted personally by me    Post-procedure details:     Distal neurologic exam:  Normal    Distal perfusion: distal pulses strong and brisk capillary refill      Procedure completion:  Tolerated well, no immediate complications    Post-procedure imaging: not applicable         NATHAN Mauro  02/25/24 1455

## 2024-02-26 ENCOUNTER — APPOINTMENT (OUTPATIENT)
Dept: PHYSICAL THERAPY | Facility: CLINIC | Age: 79
End: 2024-02-26
Payer: MEDICARE

## 2024-02-27 ENCOUNTER — OFFICE VISIT (OUTPATIENT)
Dept: ORTHOPEDIC SURGERY | Facility: CLINIC | Age: 79
End: 2024-02-27
Payer: MEDICARE

## 2024-02-27 VITALS — HEIGHT: 58 IN | BODY MASS INDEX: 26.45 KG/M2 | WEIGHT: 126 LBS

## 2024-02-27 DIAGNOSIS — S99.192A CLOSED FRACTURE OF BASE OF FIFTH METATARSAL BONE OF LEFT FOOT AT METAPHYSEAL-DIAPHYSEAL JUNCTION, INITIAL ENCOUNTER: Primary | ICD-10-CM

## 2024-02-27 DIAGNOSIS — S80.01XA CONTUSION OF RIGHT KNEE, INITIAL ENCOUNTER: ICD-10-CM

## 2024-02-27 PROCEDURE — 1036F TOBACCO NON-USER: CPT | Performed by: FAMILY MEDICINE

## 2024-02-27 PROCEDURE — 1123F ACP DISCUSS/DSCN MKR DOCD: CPT | Performed by: FAMILY MEDICINE

## 2024-02-27 PROCEDURE — 1159F MED LIST DOCD IN RCRD: CPT | Performed by: FAMILY MEDICINE

## 2024-02-27 PROCEDURE — L4361 PNEUMA/VAC WALK BOOT PRE OTS: HCPCS | Performed by: FAMILY MEDICINE

## 2024-02-27 PROCEDURE — 1126F AMNT PAIN NOTED NONE PRSNT: CPT | Performed by: FAMILY MEDICINE

## 2024-02-27 PROCEDURE — 99204 OFFICE O/P NEW MOD 45 MIN: CPT | Performed by: FAMILY MEDICINE

## 2024-02-27 PROCEDURE — 1160F RVW MEDS BY RX/DR IN RCRD: CPT | Performed by: FAMILY MEDICINE

## 2024-02-27 NOTE — PROGRESS NOTES
History of Present Illness   Chief Complaint   Patient presents with    Left Foot - Injury     Pt tripped over a curb 2/24/24  +pain and swelling       The patient is 78 y.o. female  here with a complaint of left foot injury.  Acute onset of symptoms 2 days ago after a mechanical fall, injured her left foot as well as some discomfort in her right knee after the fall, she was seen in emergency department, x-rays of her left foot revealed a nondisplaced Jama fracture of the fifth metatarsal base, knee x-rays revealed some degenerative changes but no fracture.  Patient was placed into a short leg lower extremity splint and recommended outpatient orthopedic follow-up.  She says that symptoms have improved, she has been weightbearing through her left heel with mild to moderate discomfort in her foot, she denies any significant swelling, there is been some mild bruising.  She has been taking over-the-counter medications for pain.  She says the right knee is sore but tolerable, does have small abrasion over the anterior aspect of her knee where her pain is focal to.    Past Medical History:   Diagnosis Date    Benign neoplasm of meninges, unspecified (CMS/[facility]) 07/21/2015    Meningioma    Bunion of unspecified foot     Bunion    Candidiasis, unspecified 02/10/2021    Antibiotic-induced yeast infection    COVID-19 11/03/2022    COVID    Cystocele, midline     Cystocele, midline    Encounter for other preprocedural examination 06/21/2017    Preoperative clearance    Essential (primary) hypertension     Hypertension, benign    Other abnormal and inconclusive findings on diagnostic imaging of breast 03/25/2016    Abnormal mammogram    Other general symptoms and signs 05/26/2020    Cold feeling    Other specified abnormal findings of blood chemistry 11/13/2020    Elevated TSH    Personal history of other diseases of the circulatory system 03/27/2015    History of hypertension    Personal history of other diseases of the  female genital tract 10/21/2021    History of postmenopausal bleeding    Personal history of other diseases of the nervous system and sense organs     History of carpal tunnel syndrome    Personal history of other diseases of urinary system 06/27/2019    History of hematuria    Personal history of other diseases of urinary system 02/10/2021    History of pyelonephritis    Personal history of other endocrine, nutritional and metabolic disease     History of high cholesterol    Personal history of other specified conditions 07/27/2020    History of abdominal pain    Personal history of urinary (tract) infections 03/09/2021    History of urinary tract infection    Pyelonephritis 05/01/2023    Unspecified abnormal findings in urine 09/14/2020    Malodorous urine    Urinary tract infection, site not specified 10/23/2020    Acute lower UTI    Urinary tract infection, site not specified 11/22/2021    Acute UTI       Medication Documentation Review Audit       Reviewed by NATHAN Mauro (Nurse Practitioner) on 02/25/24 at 1457      Medication Order Taking? Sig Documenting Provider Last Dose Status   amLODIPine (Norvasc) 5 mg tablet [id number]  TAKE ONE TABLET BY MOUTH EVERY DAY Alysia HOWE DO  Active   ascorbic acid (Vitamin C) 1,000 mg tablet [id number]  Take 1 tablet (1,000 mg) by mouth once daily. Historical Provider, MD  Active   cloNIDine (Catapres) 0.1 mg tablet [id number]  TAKE ONE TABLET BY MOUTH TWO TIMES A DAY Alysia HOWE DO  Active   coenzyme Q-10 200 mg capsule [id number]  Take 1 capsule (200 mg) by mouth once daily. Historical Provider, MD  Active   cranberry fruit extract (CRANBERRY EXTRACT ORAL) [id number]  Take 1 tablet by mouth twice a day. Historical Provider, MD  Active   escitalopram (Lexapro) 10 mg tablet [id number]  TAKE ONE TABLET BY MOUTH DAILY Alysia HOWE DO  Active   esomeprazole (NexIUM) 40 mg DR capsule [id number]  Take 1 capsule (40 mg) by mouth once daily. Alysia HOWE DO  Active  "  ferrous sulfate 325 (65 Fe) MG EC tablet [id number]  Take 65 mg by mouth every other day. Do not crush, chew, or split. Historical Provider, MD  Active   gabapentin (Neurontin) 100 mg capsule [id number]  Take 1-2 capsules (100-200 mg) by mouth once daily at bedtime. Alysia Perea V, DO  Active   losartan (Cozaar) 100 mg tablet [id number]  Take 1 tablet (100 mg) by mouth once daily. Alysiatony Perea V, DO  Active   MAGNESIUM GLYCINATE ORAL [id number]  Take 400 mg by mouth once daily. Historical Provider, MD  Active   omega-3 fatty acids 500 mg capsule [id number]  Take by mouth once daily. Historical Provider, MD  Active   riboflavin (vitamin B2) 100 mg tablet tablet [id number]  Take 2 tablets (200 mg) by mouth once daily. Historical Provider, MD  Active   ZINC ORAL [id number]  Take by mouth. Historical Provider, MD  Active                    Allergies   Allergen Reactions    Shellfish Containing Products Other and Unknown     'I DON'T KNOW I JUST GET REALLY SICK\"       Social History     Socioeconomic History    Marital status:      Spouse name: Not on file    Number of children: 2    Years of education: Not on file    Highest education level: Not on file   Occupational History    Not on file   Tobacco Use    Smoking status: Former     Packs/day: 1.00     Years: 30.00     Additional pack years: 0.00     Total pack years: 30.00     Types: Cigarettes     Quit date:      Years since quittin.1    Smokeless tobacco: Never   Vaping Use    Vaping Use: Never used   Substance and Sexual Activity    Alcohol use: Not Currently     Alcohol/week: 1.0 - 2.0 standard drink of alcohol     Types: 1 - 2 Standard drinks or equivalent per week     Comment: 1-2 cocktails/night    Drug use: Never    Sexual activity: Not Currently   Other Topics Concern    Not on file   Social History Narrative    Not on file     Social Determinants of Health     Financial Resource Strain: Not on file   Food Insecurity: Not on file   Transportation Needs: " Not on file   Physical Activity: Not on file   Stress: Not on file   Social Connections: Not on file   Intimate Partner Violence: Not on file   Housing Stability: Not on file       Past Surgical History:   Procedure Laterality Date    BLADDER SUSPENSION      CATARACT EXTRACTION Bilateral 07/07/2017    Cataract Surgery    OTHER SURGICAL HISTORY Bilateral 05/13/2020    Carpal tunnel surgery    OTHER SURGICAL HISTORY Bilateral 05/13/2020    Bunionectomy          Review of Systems   GENERAL: Negative  GI: Negative  MUSCULOSKELETAL: See HPI  SKIN: Negative  NEURO:  Negative     Physical Exam:    General/Constitutional: well appearing, no distress, appears stated age  HEENT: sclera clear  Respiratory: non labored breathing  Vascular: No edema, swelling or tenderness, except as noted in detailed exam.  Integumentary: No impressive skin lesions present, except as noted in detailed exam.  Neurological:  Alert and oriented   Psychological:  Normal mood and affect.  Musculoskeletal: Normal, except as noted in detailed exam and in HPI antalgic gait with walker    Right knee: There is a healing abrasion over the anterior aspect of her knee at the patella, she is focally tender to palpation at the patella, there is no crepitus, there is no tenderness at the distal femur or proximal tibia, no joint tenderness.  Relatively preserved range of motion, there is some discomfort but no weakness with resisted knee extension, there is no gross ligamentous laxity    Left foot: Relatively normal appearance, there is no significant swelling, there is some mild ecchymosis.  She is focally tender at fracture site at the base of the fifth metatarsal, no other areas of tenderness to palpation.  She has good range of motion at the ankle in all directions, there is some pain and mild weakness with resisted ankle eversion       Imaging: X-rays of left foot from 2/25/2024 independently reviewed, there is a nondisplaced Jama fracture of the base of  the fifth metatarsal, knee x-rays from the same day showed some mild degenerative changes without acute abnormality      Assessment   1. Closed fracture of base of fifth metatarsal bone of left foot at metaphyseal-diaphyseal junction, initial encounter        2. Contusion of right knee, initial encounter              Plan    Left foot fracture, explained to patient that this is a Jama fracture that is typically treated with immobilization and nonweightbearing, patient states this will be somewhat challenging but encouraged her to attempt to use combination of knee scooter and walker for transfers, short ambulation distances.  We discussed potential for nonunion and need for surgical intervention.  She will continue with calcium, vitamin D supplementation.  She will follow-up in 6 weeks with repeat x-rays of the left foot    2.  Right knee contusion, x-rays negative for fracture, tender focal to the patella, recommend ice, over-the-counter medications as needed, encouraged early active range of motion for this.

## 2024-02-28 ENCOUNTER — APPOINTMENT (OUTPATIENT)
Dept: PHYSICAL THERAPY | Facility: CLINIC | Age: 79
End: 2024-02-28
Payer: MEDICARE

## 2024-03-04 ENCOUNTER — DOCUMENTATION (OUTPATIENT)
Dept: PHYSICAL THERAPY | Facility: CLINIC | Age: 79
End: 2024-03-04
Payer: MEDICARE

## 2024-03-04 NOTE — PROGRESS NOTES
Physical Therapy                 Therapy Communication Note    Patient Name: Valerie Franklin  MRN: [id number]  Today's Date: 3/4/2024     Discipline: Physical Therapy    Missed Visit Reason:  The pt called to put her appointments on hold at this time because she broke her foot and is in a boot and is unable to drive.     Missed Time: Cancel    Comment:

## 2024-03-07 DIAGNOSIS — I10 PRIMARY HYPERTENSION: ICD-10-CM

## 2024-03-07 RX ORDER — AMLODIPINE BESYLATE 5 MG/1
5 TABLET ORAL DAILY
Qty: 90 TABLET | Refills: 1 | Status: SHIPPED | OUTPATIENT
Start: 2024-03-07

## 2024-03-13 ENCOUNTER — APPOINTMENT (OUTPATIENT)
Dept: PHYSICAL THERAPY | Facility: CLINIC | Age: 79
End: 2024-03-13
Payer: MEDICARE

## 2024-03-13 ENCOUNTER — APPOINTMENT (OUTPATIENT)
Dept: HEMATOLOGY/ONCOLOGY | Facility: CLINIC | Age: 79
End: 2024-03-13
Payer: MEDICARE

## 2024-03-25 ENCOUNTER — APPOINTMENT (OUTPATIENT)
Dept: PHYSICAL THERAPY | Facility: CLINIC | Age: 79
End: 2024-03-25
Payer: MEDICARE

## 2024-04-04 DIAGNOSIS — K21.9 GASTROESOPHAGEAL REFLUX DISEASE WITHOUT ESOPHAGITIS: ICD-10-CM

## 2024-04-04 RX ORDER — ESOMEPRAZOLE MAGNESIUM 40 MG/1
40 CAPSULE, DELAYED RELEASE ORAL DAILY
Qty: 90 CAPSULE | Refills: 1 | Status: SHIPPED | OUTPATIENT
Start: 2024-04-04

## 2024-04-09 ENCOUNTER — OFFICE VISIT (OUTPATIENT)
Dept: ORTHOPEDIC SURGERY | Facility: CLINIC | Age: 79
End: 2024-04-09
Payer: MEDICARE

## 2024-04-09 ENCOUNTER — HOSPITAL ENCOUNTER (OUTPATIENT)
Dept: RADIOLOGY | Facility: CLINIC | Age: 79
Discharge: HOME | End: 2024-04-09
Payer: MEDICARE

## 2024-04-09 VITALS — WEIGHT: 126 LBS | HEIGHT: 58 IN | BODY MASS INDEX: 26.45 KG/M2

## 2024-04-09 DIAGNOSIS — M79.672 LEFT FOOT PAIN: ICD-10-CM

## 2024-04-09 DIAGNOSIS — S99.192D CLOSED FRACTURE OF BASE OF FIFTH METATARSAL BONE OF LEFT FOOT AT METAPHYSEAL-DIAPHYSEAL JUNCTION WITH ROUTINE HEALING, SUBSEQUENT ENCOUNTER: Primary | ICD-10-CM

## 2024-04-09 PROCEDURE — 1159F MED LIST DOCD IN RCRD: CPT | Performed by: FAMILY MEDICINE

## 2024-04-09 PROCEDURE — 73630 X-RAY EXAM OF FOOT: CPT | Mod: LT

## 2024-04-09 PROCEDURE — 1036F TOBACCO NON-USER: CPT | Performed by: FAMILY MEDICINE

## 2024-04-09 PROCEDURE — 73630 X-RAY EXAM OF FOOT: CPT | Mod: LEFT SIDE | Performed by: RADIOLOGY

## 2024-04-09 PROCEDURE — 1160F RVW MEDS BY RX/DR IN RCRD: CPT | Performed by: FAMILY MEDICINE

## 2024-04-09 PROCEDURE — 99213 OFFICE O/P EST LOW 20 MIN: CPT | Performed by: FAMILY MEDICINE

## 2024-04-09 PROCEDURE — 1123F ACP DISCUSS/DSCN MKR DOCD: CPT | Performed by: FAMILY MEDICINE

## 2024-04-09 NOTE — PROGRESS NOTES
History of Present Illness   Chief Complaint   Patient presents with    Left Foot - Follow-up       The patient is 78 y.o. female  here for follow-up of left foot fifth metatarsal base/Jama fracture, patient sustained nondisplaced fracture approximately 6 weeks ago after a fall.  At her initial visit we discussed treatment options, given age recommended conservative management with boot immobilization and minimize weightbearing with walker.  She has been compliant with this she admits to improvement in symptoms over the past 6 weeks.  No new injuries or symptoms.      Past Medical History:   Diagnosis Date    Benign neoplasm of meninges, unspecified (CMS/HCC) 07/21/2015    Meningioma    Bunion of unspecified foot     Bunion    Candidiasis, unspecified 02/10/2021    Antibiotic-induced yeast infection    COVID-19 11/03/2022    COVID    Cystocele, midline     Cystocele, midline    Encounter for other preprocedural examination 06/21/2017    Preoperative clearance    Essential (primary) hypertension     Hypertension, benign    Other abnormal and inconclusive findings on diagnostic imaging of breast 03/25/2016    Abnormal mammogram    Other general symptoms and signs 05/26/2020    Cold feeling    Other specified abnormal findings of blood chemistry 11/13/2020    Elevated TSH    Personal history of other diseases of the circulatory system 03/27/2015    History of hypertension    Personal history of other diseases of the female genital tract 10/21/2021    History of postmenopausal bleeding    Personal history of other diseases of the nervous system and sense organs     History of carpal tunnel syndrome    Personal history of other diseases of urinary system 06/27/2019    History of hematuria    Personal history of other diseases of urinary system 02/10/2021    History of pyelonephritis    Personal history of other endocrine, nutritional and metabolic disease     History of high cholesterol    Personal history of other  specified conditions 07/27/2020    History of abdominal pain    Personal history of urinary (tract) infections 03/09/2021    History of urinary tract infection    Pyelonephritis 05/01/2023    Unspecified abnormal findings in urine 09/14/2020    Malodorous urine    Urinary tract infection, site not specified 10/23/2020    Acute lower UTI    Urinary tract infection, site not specified 11/22/2021    Acute UTI       Medication Documentation Review Audit       Reviewed by Venkata Marcos MD (Physician) on 02/27/24 at 1401      Medication Order Taking? Sig Documenting Provider Last Dose Status   amLODIPine (Norvasc) 5 mg tablet [id number] No TAKE ONE TABLET BY MOUTH EVERY DAY Alysia HOWE DO Taking Active   ascorbic acid (Vitamin C) 1,000 mg tablet [id number] No Take 1 tablet (1,000 mg) by mouth once daily. Historical Provider, MD Taking Active   cloNIDine (Catapres) 0.1 mg tablet [id number]  TAKE ONE TABLET BY MOUTH TWO TIMES A DAY Alysia Perea V DO  Active   coenzyme Q-10 200 mg capsule [id number] No Take 1 capsule (200 mg) by mouth once daily. Historical Provider, MD Taking Active   cranberry fruit extract (CRANBERRY EXTRACT ORAL) [id number] No Take 1 tablet by mouth twice a day. Historical Provider, MD Taking Active   escitalopram (Lexapro) 10 mg tablet [id number] No TAKE ONE TABLET BY MOUTH DAILY Alysia HOWE DO Taking Active   esomeprazole (NexIUM) 40 mg DR capsule [id number] No Take 1 capsule (40 mg) by mouth once daily. Alysia HOWE DO Taking Active   ferrous sulfate 325 (65 Fe) MG EC tablet [id number] No Take 65 mg by mouth every other day. Do not crush, chew, or split. Historical Provider, MD Taking Active   gabapentin (Neurontin) 100 mg capsule [id number] No Take 1-2 capsules (100-200 mg) by mouth once daily at bedtime. Alysia HOWE DO Taking Active   losartan (Cozaar) 100 mg tablet [id number] No Take 1 tablet (100 mg) by mouth once daily. Alysia HOWE DO Taking Active   MAGNESIUM GLYCINATE ORAL [id number] No Take 400 mg  "by mouth once daily. Historical Provider, MD Taking Active   omega-3 fatty acids 500 mg capsule [id number] No Take by mouth once daily. Historical Provider, MD Taking Active   riboflavin (vitamin B2) 100 mg tablet tablet [id number] No Take 2 tablets (200 mg) by mouth once daily. Historical Provider, MD Taking Active   ZINC ORAL [id number] No Take by mouth. Historical Provider, MD Taking Active                    Allergies   Allergen Reactions    Shellfish Containing Products Other and Unknown     'I DON'T KNOW I JUST GET REALLY SICK\"       Social History     Socioeconomic History    Marital status:      Spouse name: Not on file    Number of children: 2    Years of education: Not on file    Highest education level: Not on file   Occupational History    Not on file   Tobacco Use    Smoking status: Former     Packs/day: 1.00     Years: 30.00     Additional pack years: 0.00     Total pack years: 30.00     Types: Cigarettes     Quit date:      Years since quittin.2    Smokeless tobacco: Never   Vaping Use    Vaping Use: Never used   Substance and Sexual Activity    Alcohol use: Not Currently     Alcohol/week: 1.0 - 2.0 standard drink of alcohol     Types: 1 - 2 Standard drinks or equivalent per week     Comment: 1-2 cocktails/night    Drug use: Never    Sexual activity: Not Currently   Other Topics Concern    Not on file   Social History Narrative    Not on file     Social Determinants of Health     Financial Resource Strain: Not on file   Food Insecurity: Not on file   Transportation Needs: Not on file   Physical Activity: Not on file   Stress: Not on file   Social Connections: Not on file   Intimate Partner Violence: Not on file   Housing Stability: Not on file       Past Surgical History:   Procedure Laterality Date    BLADDER SUSPENSION      CATARACT EXTRACTION Bilateral 2017    Cataract Surgery    OTHER SURGICAL HISTORY Bilateral 2020    Carpal tunnel surgery    OTHER SURGICAL HISTORY " Bilateral 05/13/2020    Bunionectomy          Review of Systems   GENERAL: Negative  GI: Negative  MUSCULOSKELETAL: See HPI  SKIN: Negative  NEURO:  Negative     Physical Exam:    General/Constitutional: well appearing, no distress, appears stated age  HEENT: sclera clear  Respiratory: non labored breathing  Vascular: No edema, swelling or tenderness, except as noted in detailed exam.  Integumentary: No impressive skin lesions present, except as noted in detailed exam.  Neurological:  Alert and oriented   Psychological:  Normal mood and affect.  Musculoskeletal: Normal, except as noted in detailed exam.  Nonantalgic gait with attempts at weightbearing out of boot unassisted    Left foot: Relatively normal appearance, there is no significant swelling,  no ecchymosis.  She is nontender at fracture site at the base of the fifth metatarsal, no other areas of tenderness to palpation.  She has good range of motion at the ankle in all directions, no pain or weakness with resisted ankle eversion     Imaging: Repeat x-rays of left foot obtained today and independently reviewed, there is evidence of a healing nondisplaced Jama fracture of the base of the fifth metatarsal, there is some bridging callus formation seen medially,        Assessment   1. Closed fracture of base of fifth metatarsal bone of left foot at metaphyseal-diaphyseal junction with routine healing, subsequent encounter        2. Left foot pain  XR foot left 3+ views        6 weeks out from injury, doing well with good clinical and radiographic evidence of healing    Plan: Discussed further workup and treatment.  Given x-ray, exam findings I am okay with her beginning to wean herself out of the boot over the next week or so, recommended that she progress back to activity as tolerated by symptoms.  Discussed role of physical therapy but both agreed that we do not think she needs this.  I will see her back in 4 weeks for reassessment with repeat x-rays of the  left foot.

## 2024-04-12 ENCOUNTER — APPOINTMENT (OUTPATIENT)
Dept: ORTHOPEDIC SURGERY | Facility: CLINIC | Age: 79
End: 2024-04-12
Payer: MEDICARE

## 2024-04-24 DIAGNOSIS — I10 PRIMARY HYPERTENSION: ICD-10-CM

## 2024-04-24 DIAGNOSIS — F41.9 ANXIETY: ICD-10-CM

## 2024-04-24 RX ORDER — LOSARTAN POTASSIUM 100 MG/1
100 TABLET ORAL DAILY
Qty: 90 TABLET | Refills: 1 | Status: SHIPPED | OUTPATIENT
Start: 2024-04-24

## 2024-04-24 RX ORDER — ESCITALOPRAM OXALATE 10 MG/1
TABLET ORAL
Qty: 90 TABLET | Refills: 1 | Status: SHIPPED | OUTPATIENT
Start: 2024-04-24

## 2024-05-02 ENCOUNTER — OFFICE VISIT (OUTPATIENT)
Dept: OBSTETRICS AND GYNECOLOGY | Facility: CLINIC | Age: 79
End: 2024-05-02
Payer: MEDICARE

## 2024-05-02 VITALS
WEIGHT: 125 LBS | HEART RATE: 85 BPM | DIASTOLIC BLOOD PRESSURE: 77 MMHG | SYSTOLIC BLOOD PRESSURE: 118 MMHG | BODY MASS INDEX: 26.13 KG/M2

## 2024-05-02 DIAGNOSIS — N39.9 URINARY DISORDER: Primary | ICD-10-CM

## 2024-05-02 LAB
POC APPEARANCE, URINE: CLEAR
POC BILIRUBIN, URINE: NEGATIVE
POC BLOOD, URINE: NEGATIVE
POC COLOR, URINE: YELLOW
POC GLUCOSE, URINE: NEGATIVE MG/DL
POC KETONES, URINE: NEGATIVE MG/DL
POC LEUKOCYTES, URINE: ABNORMAL
POC NITRITE,URINE: NEGATIVE
POC PH, URINE: 6.5 PH
POC PROTEIN, URINE: NEGATIVE MG/DL
POC SPECIFIC GRAVITY, URINE: 1.01
POC UROBILINOGEN, URINE: 0.2 EU/DL

## 2024-05-02 PROCEDURE — 1123F ACP DISCUSS/DSCN MKR DOCD: CPT | Performed by: OBSTETRICS & GYNECOLOGY

## 2024-05-02 PROCEDURE — 3078F DIAST BP <80 MM HG: CPT | Performed by: OBSTETRICS & GYNECOLOGY

## 2024-05-02 PROCEDURE — 1160F RVW MEDS BY RX/DR IN RCRD: CPT | Performed by: OBSTETRICS & GYNECOLOGY

## 2024-05-02 PROCEDURE — 3074F SYST BP LT 130 MM HG: CPT | Performed by: OBSTETRICS & GYNECOLOGY

## 2024-05-02 PROCEDURE — 99213 OFFICE O/P EST LOW 20 MIN: CPT | Performed by: OBSTETRICS & GYNECOLOGY

## 2024-05-02 PROCEDURE — 81003 URINALYSIS AUTO W/O SCOPE: CPT | Mod: QW | Performed by: OBSTETRICS & GYNECOLOGY

## 2024-05-02 PROCEDURE — 1159F MED LIST DOCD IN RCRD: CPT | Performed by: OBSTETRICS & GYNECOLOGY

## 2024-05-02 PROCEDURE — 1126F AMNT PAIN NOTED NONE PRSNT: CPT | Performed by: OBSTETRICS & GYNECOLOGY

## 2024-05-02 ASSESSMENT — PAIN SCALES - GENERAL: PAINLEVEL: 0-NO PAIN

## 2024-05-02 NOTE — PROGRESS NOTES
"Subjective:      Ngozi Ledesma is a 77 y.o. female who presents with Mass (Lump in mouth, noticed last night )            HPI   Patient presents to urgent care reporting a 1 day history of a \"lump\" on the roof of her left side of her mouth. She states it is painful to the touch. No trauma to the area. No left upper tooth pain or difficulty eating. She denies history of the same. No fevers, chills, body aches, nausea, or vomiting.     Review of Systems   Constitutional: Negative for chills and fever.   HENT: Negative for congestion, ear pain and sore throat.         + mouth pain   Respiratory: Negative for cough, sputum production and shortness of breath.    Cardiovascular: Negative for chest pain.   Gastrointestinal: Negative for abdominal pain, diarrhea, nausea and vomiting.   Genitourinary: Negative.    Musculoskeletal: Negative for myalgias.   Skin: Negative for rash.   Neurological: Negative for dizziness.        Objective:     /64   Pulse 72   Temp 36.7 °C (98 °F) (Temporal)   Resp 16   Ht 1.651 m (5' 5\")   Wt 60.8 kg (134 lb)   SpO2 97%   BMI 22.30 kg/m²      Physical Exam   Constitutional: She is oriented to person, place, and time. She appears well-developed and well-nourished. No distress.   HENT:   Head: Normocephalic and atraumatic.   Mouth/Throat: Uvula is midline.       Erythema with soft tissue edema of left upper hard and soft palate. + TTP without fluctuance of the area noted. No active discharge.    Eyes: Pupils are equal, round, and reactive to light.   Neck: Normal range of motion.   Cardiovascular: Normal rate.    Pulmonary/Chest: Effort normal.   Musculoskeletal: Normal range of motion.   Neurological: She is alert and oriented to person, place, and time.   Skin: Skin is warm and dry. She is not diaphoretic.   Psychiatric: She has a normal mood and affect. Her behavior is normal.   Nursing note and vitals reviewed.       PMH:  has a past medical history of Personal history of " Urogynecology  Provider:  Seema Moore MD  [phone]              ASSESSMENT AND PLAN:   79 y/o patient presenting for a Well Woman exam. Comorbidities include: HLD, HTN.    Diagnoses:  #1 Well Woman Exam    Plan:  Well Woman Exam  - Upon examination, the patient has a well-healed colpocleisis and no prolapse. She also had a normal rectal exam and no masses.    Return in 1 year for a follow-up with Dr. Moore.    Scribe Attestation  By signing my name below, ICayetano Scribe, attest that this documentation has been prepared under the direction and in the presence of Seema Moore MD on 05/02/2024 at 8:27 AM.    Agree with above. I Dr. Moore, personally performed the services described in the documentation which was scribed virtually and confirm it is both complete and accurate.  Seema Moore MD      Problem List Items Addressed This Visit    None          I spent a total of eConsult Time: 25 minutes in face to face and non face to face time.        Seema Moore MD        HISTORY OF PRESENT ILLNESS:     Last seen 11/2022  Assessment:   76 year old female being assessed for complete uterine prolapse, OAB, JOSY, and vaginal atrophy.  Comorbidities include: HLD, HTN,      Diagnoses:   #1 Complete uterine prolapse     Plan:   1. Complete uterine prolapse  - Patient is doing well. No significant defects overall     Follow-up in 1 year with Dr. Moore     s/p D&C, [place] colpocleisis, transobturator sling, perineorrhaphy, and cystoscopy on 11/05/21.     Interval History:  - On February 24, 2024, the patient broke her foot.   - Her most recent mammogram was good and she is still having them ordered yearly.          Past Medical History:      Past Medical History:   Diagnosis Date    Benign neoplasm of meninges, unspecified (Multi) 07/21/2015    Meningioma    Bunion of unspecified foot     Bunion    Candidiasis, unspecified 02/10/2021    Antibiotic-induced yeast infection    COVID-19  11/03/2022    COVID    Cystocele, midline     Cystocele, midline    Encounter for other preprocedural examination 06/21/2017    Preoperative clearance    Essential (primary) hypertension     Hypertension, benign    Other abnormal and inconclusive findings on diagnostic imaging of breast 03/25/2016    Abnormal mammogram    Other general symptoms and signs 05/26/2020    Cold feeling    Other specified abnormal findings of blood chemistry 11/13/2020    Elevated TSH    Personal history of other diseases of the circulatory system 03/27/2015    History of hypertension    Personal history of other diseases of the female genital tract 10/21/2021    History of postmenopausal bleeding    Personal history of other diseases of the nervous system and sense organs     History of carpal tunnel syndrome    Personal history of other diseases of urinary system 06/27/2019    History of hematuria    Personal history of other diseases of urinary system 02/10/2021    History of pyelonephritis    Personal history of other endocrine, nutritional and metabolic disease     History of high cholesterol    Personal history of other specified conditions 07/27/2020    History of abdominal pain    Personal history of urinary (tract) infections 03/09/2021    History of urinary tract infection    Pyelonephritis 05/01/2023    Unspecified abnormal findings in urine 09/14/2020    Malodorous urine    Urinary tract infection, site not specified 10/23/2020    Acute lower UTI    Urinary tract infection, site not specified 11/22/2021    Acute UTI          Past Surgical History:       Past Surgical History:   Procedure Laterality Date    BLADDER SUSPENSION      CATARACT EXTRACTION Bilateral 07/07/2017    Cataract Surgery    OTHER SURGICAL HISTORY Bilateral 05/13/2020    Carpal tunnel surgery    OTHER SURGICAL HISTORY Bilateral 05/13/2020    Bunionectomy         Medications:       Prior to Admission medications    Medication Sig Start Date End Date Taking?  venous thrombosis and embolism and Trigeminal neuralgia of left side of face (12/26/2018). She also has no past medical history of Breast cancer (HCC).  MEDS:   Current Outpatient Prescriptions:   •  clindamycin (CLEOCIN) 300 MG Cap, Take 1 Cap by mouth 3 times a day for 10 days., Disp: 30 Cap, Rfl: 0  •  gabapentin (NEURONTIN) 300 MG Cap, Take 1 Cap by mouth every bedtime., Disp: 30 Cap, Rfl: 3  ALLERGIES:   Allergies   Allergen Reactions   • Aspirin Swelling   • Codeine Vomiting   • Declomycin [Demeclocycline Hcl] Rash           SURGHX:   Past Surgical History:   Procedure Laterality Date   • COLPOSACROPEXY ROBOTIC  2/1/2012    Performed by KIP TORO at SURGERY [facility] ORS   • CYSTOSCOPY  2/1/2012    Performed by KIP TORO at SURGERY [facility] ORS   • ABDOMINAL HYSTERECTOMY TOTAL  age 29    simple hysterectomy     SOCHX:  reports that she quit smoking about 2 years ago. Her smoking use included Cigarettes. She has a 3.75 pack-year smoking history. She has never used smokeless tobacco. She reports that she drinks alcohol. She reports that she does not use drugs.  FH: family history includes Cancer in her sister.    Assessment/Plan:     1. Oral infection  - clindamycin (CLEOCIN) 300 MG Cap; Take 1 Cap by mouth 3 times a day for 10 days.  Dispense: 30 Cap; Refill: 0   - Complete full course of antibiotics as prescribed     Encouraged to monitor the area closely. RTC or follow up with primary care if symptoms persist/worsen. ED precautions discussed. The patient demonstrated a good understanding and agreed with the treatment plan.       Authorizing Provider   amLODIPine (Norvasc) 5 mg tablet TAKE ONE TABLET BY MOUTH EVERY DAY 3/7/24   Alysia HOWE DO   ascorbic acid (Vitamin C) 1,000 mg tablet Take 1 tablet (1,000 mg) by mouth once daily.    Historical Provider, MD   cloNIDine (Catapres) 0.1 mg tablet TAKE ONE TABLET BY MOUTH TWO TIMES A DAY 2/8/24   Alysia HOWE DO   coenzyme Q-10 200 mg capsule Take 1 capsule (200 mg) by mouth once daily. 2/17/16   Historical Provider, MD   cranberry fruit extract (CRANBERRY EXTRACT ORAL) Take 1 tablet by mouth twice a day.    Historical Provider, MD   escitalopram (Lexapro) 10 mg tablet TAKE ONE TABLET BY MOUTH DAILY 4/24/24   Alysia HOWE DO   esomeprazole (NexIUM) 40 mg DR capsule TAKE ONE CAPSULE (40 MG) BY MOUTH ONCE DAILY 4/4/24   Alysia HOWE DO   ferrous sulfate 325 (65 Fe) MG EC tablet Take 65 mg by mouth every other day. Do not crush, chew, or split.    Historical Provider, MD   gabapentin (Neurontin) 100 mg capsule Take 1-2 capsules (100-200 mg) by mouth once daily at bedtime. 11/9/23   Alysia HOWE DO   losartan (Cozaar) 100 mg tablet TAKE ONE TABLET BY MOUTH ONCE DAILY. 4/24/24   Alysia HOWE DO   MAGNESIUM GLYCINATE ORAL Take 400 mg by mouth once daily.    Historical Provider, MD   omega-3 fatty acids 500 mg capsule Take by mouth once daily. 4/4/11   Historical Provider, MD   riboflavin (vitamin B2) 100 mg tablet tablet Take 2 tablets (200 mg) by mouth once daily.    Historical Provider, MD   ZINC ORAL Take by mouth.    Historical Provider, MD        ROS  Review of Systems   Constitutional: Negative.    HENT: Negative.     Eyes: Negative.    Respiratory: Negative.     Cardiovascular: Negative.    Gastrointestinal: Negative.    Endocrine: Negative.    Musculoskeletal: Negative.    Neurological: Negative.    Psychiatric/Behavioral: Negative.          Blood, Urine   Date Value Ref Range Status   01/22/2024 NEGATIVE NEGATIVE Final     Nitrite, Urine   Date Value Ref Range Status   01/22/2024  POSITIVE (A) NEGATIVE Final     Urobilinogen, Urine   Date Value Ref Range Status   01/22/2024 <2.0 <2.0 mg/dL Final         PHYSICAL EXAM:      There were no vitals taken for this visit.     No LMP recorded. Patient is postmenopausal.      Declines chaperone for physical exam.      Well developed, well nourished, in no apparent distress.   Neurologic/Psychiatric:  Awake, Alert and Oriented times 3.  Affect normal.     GENITAL/URINARY:       External Genitalia:  The patient has normal appearing external genitalia, normal skenes and bartholins glands, and a normal hair distribution.  Her vulva is without lesions, erythema or discharge.  It is non-tender with appropriate sensation.     Urethral Meatus:  Size normal, Location normal, Lesions absent, Prolapse absent,      Urethra:  Fullness absent, Masses absent,      Bladder:  Fullness absent, Masses absent, Tenderness absent,      Vagina:  General appearance normal, Estrogen effect normal, Discharge absent, Lesions absent,      Cervix: Normal, no discharge.   Uterus:  normal size and mobile  Adnexa: normal     Anus/Perineum:  Lesions absent and Masses absent normal sphincter tone, no lesions  No Hemorrhoids          Data and DIAGNOSTIC STUDIES REVIEWED   XR foot left 3+ views    Result Date: 4/10/2024  Interpreted By:  Arun Rose, STUDY: XR FOOT LEFT 3+ VIEWS; ;  4/9/2024 1:10 pm   INDICATION: Signs/Symptoms:LEFT FOOT PAIN.   COMPARISON: 02/25/2024   ACCESSION NUMBER(S): [id number]   ORDERING CLINICIAN: OLINDA FONG   FINDINGS: Left foot, three views   Fusion of the 1st MTP joint again seen.   Subacute fracture through the proximal 5th metatarsal diaphysis with mild callus formation. No osseous bridging. No new fracture dislocation seen. The joints are maintained       Subacute fracture through the proximal 5th metatarsal with mild callus formation. No malalignment     MACRO: None   Signed by: Arun Rose 4/10/2024 7:19 PM Dictation workstation:    "[id number]     No results found for: \"URINECULTURE\", \"UAMICCOMM\"   Lab Results   Component Value Date    GLUCOSE 78 01/22/2024    CALCIUM 9.8 01/22/2024     01/22/2024    K 3.7 01/22/2024    CO2 28 01/22/2024    CL 99 01/22/2024    BUN 9 01/22/2024    CREATININE 0.67 01/22/2024     Lab Results   Component Value Date    WBC 5.9 01/22/2024    HGB 14.5 01/22/2024    HCT 44.3 01/22/2024    MCV 83 01/22/2024     01/22/2024          Seema Moore MD        "

## 2024-05-03 ASSESSMENT — ENCOUNTER SYMPTOMS
ENDOCRINE NEGATIVE: 1
RESPIRATORY NEGATIVE: 1
GASTROINTESTINAL NEGATIVE: 1
EYES NEGATIVE: 1
NEUROLOGICAL NEGATIVE: 1
PSYCHIATRIC NEGATIVE: 1
CARDIOVASCULAR NEGATIVE: 1
MUSCULOSKELETAL NEGATIVE: 1
CONSTITUTIONAL NEGATIVE: 1

## 2024-05-07 ENCOUNTER — HOSPITAL ENCOUNTER (OUTPATIENT)
Dept: RADIOLOGY | Facility: CLINIC | Age: 79
Discharge: HOME | End: 2024-05-07
Payer: MEDICARE

## 2024-05-07 ENCOUNTER — OFFICE VISIT (OUTPATIENT)
Dept: ORTHOPEDIC SURGERY | Facility: CLINIC | Age: 79
End: 2024-05-07
Payer: MEDICARE

## 2024-05-07 DIAGNOSIS — S99.192D CLOSED FRACTURE OF BASE OF FIFTH METATARSAL BONE OF LEFT FOOT AT METAPHYSEAL-DIAPHYSEAL JUNCTION WITH ROUTINE HEALING, SUBSEQUENT ENCOUNTER: Primary | ICD-10-CM

## 2024-05-07 DIAGNOSIS — M79.672 LEFT FOOT PAIN: ICD-10-CM

## 2024-05-07 DIAGNOSIS — S99.192D CLOSED FRACTURE OF BASE OF FIFTH METATARSAL BONE OF LEFT FOOT AT METAPHYSEAL-DIAPHYSEAL JUNCTION WITH ROUTINE HEALING, SUBSEQUENT ENCOUNTER: ICD-10-CM

## 2024-05-07 PROCEDURE — 99213 OFFICE O/P EST LOW 20 MIN: CPT | Performed by: FAMILY MEDICINE

## 2024-05-07 PROCEDURE — 1160F RVW MEDS BY RX/DR IN RCRD: CPT | Performed by: FAMILY MEDICINE

## 2024-05-07 PROCEDURE — 73630 X-RAY EXAM OF FOOT: CPT | Mod: LT

## 2024-05-07 PROCEDURE — 1123F ACP DISCUSS/DSCN MKR DOCD: CPT | Performed by: FAMILY MEDICINE

## 2024-05-07 PROCEDURE — 1159F MED LIST DOCD IN RCRD: CPT | Performed by: FAMILY MEDICINE

## 2024-05-07 PROCEDURE — 73630 X-RAY EXAM OF FOOT: CPT | Mod: LEFT SIDE | Performed by: RADIOLOGY

## 2024-05-07 PROCEDURE — 1036F TOBACCO NON-USER: CPT | Performed by: FAMILY MEDICINE

## 2024-05-07 NOTE — PROGRESS NOTES
History of Present Illness   Chief Complaint   Patient presents with    Left Foot - Fracture, Follow-up     5th metatarsal        The patient is 78 y.o. female  here for follow-up of left foot fifth metatarsal base/Jama fracture, patient sustained nondisplaced fracture approximately 10 weeks ago after a fall.  Treatment has been conservative.  She was immobilized in a boot with modified weightbearing x 6 weeks, at most recent follow-up visit 1 week ago x-rays did show some early healing, she was nontender to palpation at fracture site.  I did recommend that she begin weaning herself out of the boot as tolerated by symptoms.  She has been able to do this, she is wearing a comfortable tennis shoe, she denies any residual pain, says she will get some occasional swelling which she will ice and elevate.  Overall feels like she is doing well.      Past Medical History:   Diagnosis Date    Benign neoplasm of meninges, unspecified (Multi) 07/21/2015    Meningioma    Bunion of unspecified foot     Bunion    Candidiasis, unspecified 02/10/2021    Antibiotic-induced yeast infection    COVID-19 11/03/2022    COVID    Cystocele, midline     Cystocele, midline    Encounter for other preprocedural examination 06/21/2017    Preoperative clearance    Essential (primary) hypertension     Hypertension, benign    Other abnormal and inconclusive findings on diagnostic imaging of breast 03/25/2016    Abnormal mammogram    Other general symptoms and signs 05/26/2020    Cold feeling    Other specified abnormal findings of blood chemistry 11/13/2020    Elevated TSH    Personal history of other diseases of the circulatory system 03/27/2015    History of hypertension    Personal history of other diseases of the female genital tract 10/21/2021    History of postmenopausal bleeding    Personal history of other diseases of the nervous system and sense organs     History of carpal tunnel syndrome    Personal history of other diseases of  urinary system 06/27/2019    History of hematuria    Personal history of other diseases of urinary system 02/10/2021    History of pyelonephritis    Personal history of other endocrine, nutritional and metabolic disease     History of high cholesterol    Personal history of other specified conditions 07/27/2020    History of abdominal pain    Personal history of urinary (tract) infections 03/09/2021    History of urinary tract infection    Pyelonephritis 05/01/2023    Unspecified abnormal findings in urine 09/14/2020    Malodorous urine    Urinary tract infection, site not specified 10/23/2020    Acute lower UTI    Urinary tract infection, site not specified 11/22/2021    Acute UTI       Medication Documentation Review Audit       Reviewed by Kassy Marrufo MA (Medical Assistant) on 05/02/24 at 1417      Medication Order Taking? Sig Documenting Provider Last Dose Status   amLODIPine (Norvasc) 5 mg tablet [id number] Yes TAKE ONE TABLET BY MOUTH EVERY DAY Alysia Mattsnoe V, DO Taking Active   ascorbic acid (Vitamin C) 1,000 mg tablet [id number] No Take 1 tablet (1,000 mg) by mouth once daily. Historical Provider, MD Not Taking Active   cloNIDine (Catapres) 0.1 mg tablet [id number] Yes TAKE ONE TABLET BY MOUTH TWO TIMES A DAY Alysia Perea V, DO Taking Active   coenzyme Q-10 200 mg capsule [id number] Yes Take 1 capsule (200 mg) by mouth once daily. Historical Provider, MD Taking Active   cranberry fruit extract (CRANBERRY EXTRACT ORAL) [id number] Yes Take 1 tablet by mouth twice a day. Historical Provider, MD Taking Active   escitalopram (Lexapro) 10 mg tablet [id number] Yes TAKE ONE TABLET BY MOUTH DAILY Alysia Perea V, DO Taking Active   esomeprazole (NexIUM) 40 mg DR capsule [id number] Yes TAKE ONE CAPSULE (40 MG) BY MOUTH ONCE DAILY Alysia Perea V, DO Taking Active   ferrous sulfate 325 (65 Fe) MG EC tablet [id number] Yes Take 65 mg by mouth every other day. Do not crush, chew, or split. Historical Provider, MD Unknown Active  "  gabapentin (Neurontin) 100 mg capsule [id number] Yes Take 1-2 capsules (100-200 mg) by mouth once daily at bedtime. Alysiatony Perea V, DO Taking Active   losartan (Cozaar) 100 mg tablet [id number] Yes TAKE ONE TABLET BY MOUTH ONCE DAILY. Alysia Perea V, DO Taking Active   MAGNESIUM GLYCINATE ORAL [id number] Yes Take 400 mg by mouth once daily. Historical Provider, MD Taking Active   omega-3 fatty acids 500 mg capsule [id number] No Take by mouth once daily. Historical Provider, MD Not Taking Active   riboflavin (vitamin B2) 100 mg tablet tablet [id number] Yes Take 2 tablets (200 mg) by mouth once daily. Historical Provider, MD Taking Active   ZINC ORAL [id number] Yes Take by mouth. Historical Provider, MD Taking Active                    Allergies   Allergen Reactions    Shellfish Containing Products Other and Unknown     'I DON'T KNOW I JUST GET REALLY SICK\"       Social History     Socioeconomic History    Marital status:      Spouse name: Not on file    Number of children: 2    Years of education: Not on file    Highest education level: Not on file   Occupational History    Not on file   Tobacco Use    Smoking status: Former     Current packs/day: 0.00     Average packs/day: 1 pack/day for 30.0 years (30.0 ttl pk-yrs)     Types: Cigarettes     Start date:      Quit date:      Years since quittin.3    Smokeless tobacco: Never   Vaping Use    Vaping status: Never Used   Substance and Sexual Activity    Alcohol use: Not Currently     Alcohol/week: 1.0 - 2.0 standard drink of alcohol     Types: 1 - 2 Standard drinks or equivalent per week     Comment: 1-2 cocktails/night    Drug use: Never    Sexual activity: Not Currently   Other Topics Concern    Not on file   Social History Narrative    Not on file     Social Determinants of Health     Financial Resource Strain: Not on file   Food Insecurity: Not on file   Transportation Needs: Not on file   Physical Activity: Not on file   Stress: Not on file   Social " Connections: Not on file   Intimate Partner Violence: Not on file   Housing Stability: Not on file       Past Surgical History:   Procedure Laterality Date    BLADDER SUSPENSION      CATARACT EXTRACTION Bilateral 07/07/2017    Cataract Surgery    OTHER SURGICAL HISTORY Bilateral 05/13/2020    Carpal tunnel surgery    OTHER SURGICAL HISTORY Bilateral 05/13/2020    Bunionectomy          Review of Systems   GENERAL: Negative  GI: Negative  MUSCULOSKELETAL: See HPI  SKIN: Negative  NEURO:  Negative     Physical Exam:    General/Constitutional: well appearing, no distress, appears stated age  HEENT: sclera clear  Respiratory: non labored breathing  Vascular: No edema, swelling or tenderness, except as noted in detailed exam.  Integumentary: No impressive skin lesions present, except as noted in detailed exam.  Neurological:  Alert and oriented   Psychological:  Normal mood and affect.  Musculoskeletal: Normal, except as noted in detailed exam.  Normal gait, unassisted    Left foot: Relatively normal appearance, there is no significant swelling,  no ecchymosis.  She is nontender at fracture site at the base of the fifth metatarsal, no other areas of tenderness to palpation.  She has good range of motion at the ankle in all directions, no pain or weakness with resisted ankle eversion     Imaging: Repeat x-rays of left foot obtained today and independently reviewed, there is evidence of continued healing nondisplaced Jama fracture of the base of the fifth metatarsal, fracture line is less visible medially        Assessment   1. Closed fracture of base of fifth metatarsal bone of left foot at metaphyseal-diaphyseal junction with routine healing, subsequent encounter  XR foot left 3+ views      2. Left foot pain  XR foot left 3+ views        10 weeks out from injury, doing well with good clinical and radiographic evidence of healing    Plan: Patient will continue with activity as tolerated, she will follow-up as  needed.

## 2024-05-08 ENCOUNTER — OFFICE VISIT (OUTPATIENT)
Dept: HEMATOLOGY/ONCOLOGY | Facility: CLINIC | Age: 79
End: 2024-05-08
Payer: MEDICARE

## 2024-05-08 ENCOUNTER — LAB (OUTPATIENT)
Dept: LAB | Facility: CLINIC | Age: 79
End: 2024-05-08
Payer: MEDICARE

## 2024-05-08 VITALS
BODY MASS INDEX: 26.26 KG/M2 | HEART RATE: 82 BPM | OXYGEN SATURATION: 98 % | RESPIRATION RATE: 20 BRPM | TEMPERATURE: 98.4 F | WEIGHT: 125.66 LBS | DIASTOLIC BLOOD PRESSURE: 90 MMHG | SYSTOLIC BLOOD PRESSURE: 134 MMHG

## 2024-05-08 DIAGNOSIS — M54.16 LUMBAR RADICULOPATHY: ICD-10-CM

## 2024-05-08 DIAGNOSIS — D50.9 MICROCYTIC ANEMIA: ICD-10-CM

## 2024-05-08 DIAGNOSIS — D50.9 MICROCYTIC ANEMIA: Primary | ICD-10-CM

## 2024-05-08 LAB
ALBUMIN SERPL BCP-MCNC: 4 G/DL (ref 3.4–5)
ALP SERPL-CCNC: 84 U/L (ref 33–136)
ALT SERPL W P-5'-P-CCNC: 11 U/L (ref 7–45)
ANION GAP SERPL CALC-SCNC: 12 MMOL/L (ref 10–20)
AST SERPL W P-5'-P-CCNC: 15 U/L (ref 9–39)
BASOPHILS # BLD AUTO: 0.05 X10*3/UL (ref 0–0.1)
BASOPHILS NFR BLD AUTO: 0.8 %
BILIRUB SERPL-MCNC: 0.4 MG/DL (ref 0–1.2)
BUN SERPL-MCNC: 12 MG/DL (ref 6–23)
CALCIUM SERPL-MCNC: 9.4 MG/DL (ref 8.6–10.3)
CHLORIDE SERPL-SCNC: 98 MMOL/L (ref 98–107)
CO2 SERPL-SCNC: 26 MMOL/L (ref 21–32)
CREAT SERPL-MCNC: 0.7 MG/DL (ref 0.5–1.05)
EGFRCR SERPLBLD CKD-EPI 2021: 89 ML/MIN/1.73M*2
EOSINOPHIL # BLD AUTO: 0.26 X10*3/UL (ref 0–0.4)
EOSINOPHIL NFR BLD AUTO: 4.3 %
ERYTHROCYTE [DISTWIDTH] IN BLOOD BY AUTOMATED COUNT: 12.6 % (ref 11.5–14.5)
GLUCOSE SERPL-MCNC: 110 MG/DL (ref 74–99)
HCT VFR BLD AUTO: 38.3 % (ref 36–46)
HGB BLD-MCNC: 13 G/DL (ref 12–16)
IMM GRANULOCYTES # BLD AUTO: 0.05 X10*3/UL (ref 0–0.5)
IMM GRANULOCYTES NFR BLD AUTO: 0.8 % (ref 0–0.9)
IRON SATN MFR SERPL: 26 % (ref 25–45)
IRON SERPL-MCNC: 76 UG/DL (ref 35–150)
LYMPHOCYTES # BLD AUTO: 1.59 X10*3/UL (ref 0.8–3)
LYMPHOCYTES NFR BLD AUTO: 26.6 %
MCH RBC QN AUTO: 27.7 PG (ref 26–34)
MCHC RBC AUTO-ENTMCNC: 33.9 G/DL (ref 32–36)
MCV RBC AUTO: 82 FL (ref 80–100)
MONOCYTES # BLD AUTO: 0.44 X10*3/UL (ref 0.05–0.8)
MONOCYTES NFR BLD AUTO: 7.4 %
NEUTROPHILS # BLD AUTO: 3.59 X10*3/UL (ref 1.6–5.5)
NEUTROPHILS NFR BLD AUTO: 60.1 %
NRBC BLD-RTO: 0 /100 WBCS (ref 0–0)
PLATELET # BLD AUTO: 348 X10*3/UL (ref 150–450)
POTASSIUM SERPL-SCNC: 3.8 MMOL/L (ref 3.5–5.3)
PROT SERPL-MCNC: 6.6 G/DL (ref 6.4–8.2)
RBC # BLD AUTO: 4.7 X10*6/UL (ref 4–5.2)
SODIUM SERPL-SCNC: 132 MMOL/L (ref 136–145)
TIBC SERPL-MCNC: 287 UG/DL (ref 240–445)
UIBC SERPL-MCNC: 211 UG/DL (ref 110–370)
WBC # BLD AUTO: 6 X10*3/UL (ref 4.4–11.3)

## 2024-05-08 PROCEDURE — 82607 VITAMIN B-12: CPT | Mod: PARLAB

## 2024-05-08 PROCEDURE — 99213 OFFICE O/P EST LOW 20 MIN: CPT | Performed by: INTERNAL MEDICINE

## 2024-05-08 PROCEDURE — 36415 COLL VENOUS BLD VENIPUNCTURE: CPT

## 2024-05-08 PROCEDURE — 3075F SYST BP GE 130 - 139MM HG: CPT | Performed by: INTERNAL MEDICINE

## 2024-05-08 PROCEDURE — 3080F DIAST BP >= 90 MM HG: CPT | Performed by: INTERNAL MEDICINE

## 2024-05-08 PROCEDURE — 83540 ASSAY OF IRON: CPT

## 2024-05-08 PROCEDURE — 84075 ASSAY ALKALINE PHOSPHATASE: CPT

## 2024-05-08 PROCEDURE — 1123F ACP DISCUSS/DSCN MKR DOCD: CPT | Performed by: INTERNAL MEDICINE

## 2024-05-08 PROCEDURE — 82728 ASSAY OF FERRITIN: CPT | Mod: PARLAB

## 2024-05-08 PROCEDURE — 1126F AMNT PAIN NOTED NONE PRSNT: CPT | Performed by: INTERNAL MEDICINE

## 2024-05-08 PROCEDURE — 1159F MED LIST DOCD IN RCRD: CPT | Performed by: INTERNAL MEDICINE

## 2024-05-08 PROCEDURE — 1160F RVW MEDS BY RX/DR IN RCRD: CPT | Performed by: INTERNAL MEDICINE

## 2024-05-08 PROCEDURE — 85025 COMPLETE CBC W/AUTO DIFF WBC: CPT

## 2024-05-08 ASSESSMENT — PAIN SCALES - GENERAL: PAINLEVEL: 0-NO PAIN

## 2024-05-08 NOTE — PROGRESS NOTES
LOCATION:  [facility] at [facility].     HEMATOLOGY & ONCOLOGY PROBLEMS:  1. Microcytic anemia.          a. Initial workup with findings suggestive of iron deficiency in .         b. Tried on oral iron therapy; d/c due to diarrhea.          c. Trial of IV iron infusion with Feraheme in Aug 2020.     CHIEF COMPLAINT:    The patient is in the clinic today for management of iron deficiency anemia.                  HISTORY:   Ms. Franklin is a 78 year old pleasant female with anemia. She initially presented to her primary care physician with complaints of generalized weakness. CBC from 2020 showed platelet counts  of 523K, hemoglobin of 4.8, and hematocrit of 19. White cell counts were all normal.  She was immediately admitted to [facility] and was supported with blood transfusion. Further workup revealed significant iron deficiency and she was supported with  IV iron infusion.  Urgent GI workup including EGD and colonoscopy was unremarkable.  She was initially tried on oral iron therapy but it was discontinued due to problem with diarrhea/constipation.  She was treated with additional IV iron infusion therapy  with Feraheme in 2020 with good response.     INTERVAL HISTORY:  Patient denies any specific new complaints other than persistent fatigue. She had a fall in 2024 and broke her left foot. She was managed conservatively. No history of nausea, vomiting, fever, night sweats, diarrhea, rash, anorexia or weight loss.      PAST MEDICAL HISTORY:   1. Anemia as detailed above.   2. Hypertension.   3. History of peptic ulcer disease.   4. History of meningiomas, s/p gamma-knife in .  5. Hypertension  6. Hyperlipidemia.     SOCIAL HISTORY:   She is a  and lives in [place].   in . Quit smoking in  with 1 ppd for 30 years prior smoking history.  Admits to drinking a glass of wine in the evenings. 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  Born and raised in [place].     FAMILY HISTORY:    Mother  at age 95 from stroke. Father  at age 73 from MI. Had 2 siblings, one sister  from COPD. Has 2 children and 7 grandchildren ~ all alive and well. No other specific history of bleeding, clotting or malignant disorder in the family.     REVIEW OF SYSTEMS:  Pertinent finding as per the history above. All other systems have been reviewed and generally negative and noncontributory.     ALLERGY & MEDICATIONS:  Allergies and latest outpatient medications list were reviewed in the EMR.    VITAL SIGNS  BSA: 1.53 meters squared  /90   Pulse 82   Temp 36.9 °C (98.4 °F) (Temporal)   Resp 20   Wt 57 kg (125 lb 10.6 oz)   SpO2 98%   BMI 26.26 kg/m²     PHYSICAL EXAMINATION:  Detailed examination not done.    LAB RESULTS:  CBC from today shows a normal hemoglobin of 13 with MCV of 82.  WBC, platelets and metabolic profile is normal.  Iron panel, ferritin and vitamin B12 level are pending.     ASSESSMENT & PLAN:  1. Severe microcytic anemia.  Please refer to the details of initial presentation and management as outlined above. In summary, she  initially presented to her  primary care physician with complaints of generalized weakness. CBC from 2020 showed platelet counts of 523K, hemoglobin of 4.8, and hematocrit of 19. White cell counts were all normal.  She was immediately admitted to [facility] and was supported  with blood transfusion. Further workup revealed significant iron deficiency and she was supported with IV iron infusion.  Urgent GI workup including EGD and colonoscopy was unremarkable.  She was initially tried on oral iron therapy but it was discontinued  due to problem with diarrhea/constipation.  She was treated with additional IV iron infusion therapy with Feraheme in 2020 with good response.     Patient is reassured that overall she is doing much better.  Hemoglobin is stable around 13 today.  Iron panel  and ferritin level is pending.  For now we will continue to follow  her closely.  So far initial GI evaluation was unremarkable.  As stated above capsule endoscopy study could not be completed due to some complications.  My impression is that she has issues with intermittent small bowel AVMs related bleeding or similar problems.  I strongly encourage her to continue to follow with the GI team.  In the meantime, we will monitor her closely and support her with IV iron as needed.      2. Follow up:  Patient will return to the clinic in 6 months.  Advised to contact us immediately if there are any new questions or problems.     This note has been transcribed using Dragon voice recognition system and there is a possibility of unintentional typing misprints.

## 2024-05-09 LAB
FERRITIN SERPL-MCNC: 97 NG/ML (ref 8–150)
VIT B12 SERPL-MCNC: 464 PG/ML (ref 211–911)

## 2024-05-09 RX ORDER — GABAPENTIN 100 MG/1
100-200 CAPSULE ORAL NIGHTLY
Qty: 180 CAPSULE | Refills: 1 | Status: SHIPPED | OUTPATIENT
Start: 2024-05-09

## 2024-06-19 ENCOUNTER — DOCUMENTATION (OUTPATIENT)
Dept: PHYSICAL THERAPY | Facility: CLINIC | Age: 79
End: 2024-06-19
Payer: MEDICARE

## 2024-06-19 NOTE — PROGRESS NOTES
Physical Therapy    Discharge Summary    Name: Valerie Franklin  MRN: [id number]  : 1945  Date: 24    Discharge Summary: PT    Discharge Information: Date of discharge 24    Therapy Summary: The pt did not schedule any additional visits since she fractured her ankle.    Discharge Status: Discharged from 24 to 3/4/24     Rehab Discharge Reason: Failed to schedule and/or keep follow-up appointment(s)

## 2024-07-22 ENCOUNTER — APPOINTMENT (OUTPATIENT)
Dept: PRIMARY CARE | Facility: CLINIC | Age: 79
End: 2024-07-22
Payer: MEDICARE

## 2024-07-22 ENCOUNTER — LAB (OUTPATIENT)
Dept: LAB | Facility: LAB | Age: 79
End: 2024-07-22
Payer: MEDICARE

## 2024-07-22 VITALS
SYSTOLIC BLOOD PRESSURE: 124 MMHG | WEIGHT: 122.1 LBS | DIASTOLIC BLOOD PRESSURE: 82 MMHG | HEART RATE: 76 BPM | BODY MASS INDEX: 25.52 KG/M2

## 2024-07-22 DIAGNOSIS — N32.81 OVERACTIVE BLADDER: Primary | ICD-10-CM

## 2024-07-22 DIAGNOSIS — E78.5 BORDERLINE HYPERLIPIDEMIA: ICD-10-CM

## 2024-07-22 DIAGNOSIS — I10 ESSENTIAL HYPERTENSION, BENIGN: ICD-10-CM

## 2024-07-22 DIAGNOSIS — M54.16 LUMBAR RADICULOPATHY: ICD-10-CM

## 2024-07-22 DIAGNOSIS — E87.1 HYPONATREMIA: ICD-10-CM

## 2024-07-22 DIAGNOSIS — R79.89 ELEVATED TSH: ICD-10-CM

## 2024-07-22 PROBLEM — E87.6 HYPOKALEMIA: Status: RESOLVED | Noted: 2023-04-10 | Resolved: 2024-07-22

## 2024-07-22 LAB
ANION GAP SERPL CALC-SCNC: 14 MMOL/L (ref 10–20)
BUN SERPL-MCNC: 9 MG/DL (ref 6–23)
CALCIUM SERPL-MCNC: 9.6 MG/DL (ref 8.6–10.6)
CHLORIDE SERPL-SCNC: 95 MMOL/L (ref 98–107)
CO2 SERPL-SCNC: 29 MMOL/L (ref 21–32)
CREAT SERPL-MCNC: 0.69 MG/DL (ref 0.5–1.05)
EGFRCR SERPLBLD CKD-EPI 2021: 89 ML/MIN/1.73M*2
GLUCOSE SERPL-MCNC: 74 MG/DL (ref 74–99)
POTASSIUM SERPL-SCNC: 4.7 MMOL/L (ref 3.5–5.3)
SODIUM SERPL-SCNC: 133 MMOL/L (ref 136–145)

## 2024-07-22 PROCEDURE — 1123F ACP DISCUSS/DSCN MKR DOCD: CPT | Performed by: INTERNAL MEDICINE

## 2024-07-22 PROCEDURE — 3079F DIAST BP 80-89 MM HG: CPT | Performed by: INTERNAL MEDICINE

## 2024-07-22 PROCEDURE — 3074F SYST BP LT 130 MM HG: CPT | Performed by: INTERNAL MEDICINE

## 2024-07-22 PROCEDURE — 80048 BASIC METABOLIC PNL TOTAL CA: CPT

## 2024-07-22 PROCEDURE — 1036F TOBACCO NON-USER: CPT | Performed by: INTERNAL MEDICINE

## 2024-07-22 PROCEDURE — 1160F RVW MEDS BY RX/DR IN RCRD: CPT | Performed by: INTERNAL MEDICINE

## 2024-07-22 PROCEDURE — 99214 OFFICE O/P EST MOD 30 MIN: CPT | Performed by: INTERNAL MEDICINE

## 2024-07-22 PROCEDURE — 84443 ASSAY THYROID STIM HORMONE: CPT

## 2024-07-22 PROCEDURE — 1159F MED LIST DOCD IN RCRD: CPT | Performed by: INTERNAL MEDICINE

## 2024-07-22 PROCEDURE — 36415 COLL VENOUS BLD VENIPUNCTURE: CPT

## 2024-07-22 ASSESSMENT — ENCOUNTER SYMPTOMS
DIAPHORESIS: 0
APPETITE CHANGE: 0
APNEA: 0
FREQUENCY: 1
RECTAL PAIN: 0
BACK PAIN: 0
DIFFICULTY URINATING: 0
DYSURIA: 0
CHILLS: 0
CHEST TIGHTNESS: 0
FEVER: 0
ARTHRALGIAS: 0
ABDOMINAL PAIN: 0
FATIGUE: 1
COUGH: 1
ABDOMINAL DISTENTION: 0
DIARRHEA: 1
UNEXPECTED WEIGHT CHANGE: 0
STRIDOR: 0
VOMITING: 0
PALPITATIONS: 0
HEADACHES: 0
NERVOUS/ANXIOUS: 0
CHOKING: 0
NAUSEA: 0
DIZZINESS: 0
ACTIVITY CHANGE: 0
WHEEZING: 0
LIGHT-HEADEDNESS: 0
BLOOD IN STOOL: 0
CONSTIPATION: 0
DYSPHORIC MOOD: 0
SHORTNESS OF BREATH: 0
ANAL BLEEDING: 0
SLEEP DISTURBANCE: 0

## 2024-07-22 NOTE — PATIENT INSTRUCTIONS
Get blood work done today as we are checking that your sodium level has improved  Continue all medications as directed-call when refills needed  Continue to adhere to healthy diet and exercise/be as active as able  Follow up here in 1 year-this is your full physical (come fasting for blood work and urine to be done that day)

## 2024-07-22 NOTE — PROGRESS NOTES
Subjective   Patient ID: Valerie Franklin is a 78 y.o. female who presents for Follow-up (6-month follow-up).    HPI  Pt here in 6 month follow up.  She tells me she just had covid earlier this month-she had cough/runny nose.  She feels better now-minor cough still.  She was traveling at this time-she was in [place] and [place] area in the last few weeks.     She is taking her medications as directed.  No refills needed.    She had labs that showed minor low in sodium back in May.  Her potassium at that time was normal.      She did break her left foot after falling in late 2/2024.  She tells me she tripped on the curb from her walkway to her car.  She had her hands full with things so she was unable to break her fall.  She ended up seeing ortho for the fracture.  She is no longer having any pain-occasional mild swelling only.      Review of Systems   Constitutional:  Positive for fatigue. Negative for activity change, appetite change, chills, diaphoresis, fever and unexpected weight change.   Respiratory:  Positive for cough. Negative for apnea, choking, chest tightness, shortness of breath, wheezing and stridor.    Cardiovascular:  Negative for chest pain, palpitations and leg swelling.   Gastrointestinal:  Positive for diarrhea. Negative for abdominal distention, abdominal pain, anal bleeding, blood in stool, constipation, nausea, rectal pain and vomiting.   Genitourinary:  Positive for frequency. Negative for difficulty urinating, dysuria, pelvic pain and urgency.   Musculoskeletal:  Negative for arthralgias and back pain.   Neurological:  Negative for dizziness, light-headedness and headaches.   Psychiatric/Behavioral:  Negative for dysphoric mood and sleep disturbance. The patient is not nervous/anxious.        Objective   /82   Pulse 76   Wt 55.4 kg (122 lb 1.6 oz)   BMI 25.52 kg/m²    Physical Exam  Constitutional:       Appearance: Normal appearance.   Cardiovascular:      Rate and Rhythm: Normal  rate and regular rhythm.      Heart sounds: Normal heart sounds.   Pulmonary:      Effort: Pulmonary effort is normal.      Breath sounds: Normal breath sounds.   Abdominal:      General: Bowel sounds are normal.   Musculoskeletal:      Right lower leg: No edema.      Left lower leg: No edema.   Lymphadenopathy:      Cervical: No cervical adenopathy.   Neurological:      Mental Status: She is alert and oriented to person, place, and time.   Psychiatric:         Mood and Affect: Mood normal.         Assessment/Plan   Problem List Items Addressed This Visit       Borderline hyperlipidemia     Off statin at this time  Last LDL was 99 in 1/2024  Will repeat at physical in 1/2025          Relevant Orders    Follow Up In Primary Care - Medicare Annual    Hyponatremia     Repeat BMP today          Lumbar radiculopathy    Overactive bladder - Primary     No other abnormalities with urine noted at this time          Essential hypertension, benign     BP well controlled          Relevant Orders    Basic Metabolic Panel    Follow Up In Primary Care - Medicare Annual

## 2024-07-23 DIAGNOSIS — E87.1 HYPONATREMIA: ICD-10-CM

## 2024-07-23 DIAGNOSIS — R79.89 ELEVATED TSH: Primary | ICD-10-CM

## 2024-07-23 LAB — TSH SERPL-ACNC: 3.51 MIU/L (ref 0.44–3.98)

## 2024-08-05 DIAGNOSIS — I10 PRIMARY HYPERTENSION: ICD-10-CM

## 2024-08-05 RX ORDER — CLONIDINE HYDROCHLORIDE 0.1 MG/1
0.1 TABLET ORAL 2 TIMES DAILY
Qty: 180 TABLET | Refills: 0 | Status: SHIPPED | OUTPATIENT
Start: 2024-08-05

## 2024-09-02 DIAGNOSIS — I10 PRIMARY HYPERTENSION: ICD-10-CM

## 2024-09-03 RX ORDER — AMLODIPINE BESYLATE 5 MG/1
5 TABLET ORAL DAILY
Qty: 90 TABLET | Refills: 1 | Status: SHIPPED | OUTPATIENT
Start: 2024-09-03

## 2024-09-14 DIAGNOSIS — K21.9 GASTROESOPHAGEAL REFLUX DISEASE WITHOUT ESOPHAGITIS: ICD-10-CM

## 2024-09-16 RX ORDER — ESOMEPRAZOLE MAGNESIUM 40 MG/1
40 CAPSULE, DELAYED RELEASE ORAL DAILY
Qty: 90 CAPSULE | Refills: 1 | Status: SHIPPED | OUTPATIENT
Start: 2024-09-16

## 2024-10-18 DIAGNOSIS — F41.9 ANXIETY: ICD-10-CM

## 2024-10-18 DIAGNOSIS — I10 PRIMARY HYPERTENSION: ICD-10-CM

## 2024-10-18 RX ORDER — LOSARTAN POTASSIUM 100 MG/1
100 TABLET ORAL DAILY
Qty: 90 TABLET | Refills: 1 | Status: SHIPPED | OUTPATIENT
Start: 2024-10-18

## 2024-10-18 RX ORDER — ESCITALOPRAM OXALATE 10 MG/1
TABLET ORAL
Qty: 90 TABLET | Refills: 1 | Status: SHIPPED | OUTPATIENT
Start: 2024-10-18

## 2024-11-06 ENCOUNTER — LAB (OUTPATIENT)
Dept: LAB | Facility: CLINIC | Age: 79
End: 2024-11-06
Payer: MEDICARE

## 2024-11-06 ENCOUNTER — OFFICE VISIT (OUTPATIENT)
Dept: HEMATOLOGY/ONCOLOGY | Facility: CLINIC | Age: 79
End: 2024-11-06
Payer: MEDICARE

## 2024-11-06 VITALS
SYSTOLIC BLOOD PRESSURE: 148 MMHG | HEART RATE: 68 BPM | BODY MASS INDEX: 25.34 KG/M2 | OXYGEN SATURATION: 98 % | TEMPERATURE: 97.7 F | WEIGHT: 121.25 LBS | DIASTOLIC BLOOD PRESSURE: 88 MMHG | RESPIRATION RATE: 20 BRPM

## 2024-11-06 DIAGNOSIS — D50.9 MICROCYTIC ANEMIA: ICD-10-CM

## 2024-11-06 LAB
ALBUMIN SERPL BCP-MCNC: 4 G/DL (ref 3.4–5)
ALP SERPL-CCNC: 68 U/L (ref 33–136)
ALT SERPL W P-5'-P-CCNC: 12 U/L (ref 7–45)
ANION GAP SERPL CALC-SCNC: 10 MMOL/L (ref 10–20)
AST SERPL W P-5'-P-CCNC: 15 U/L (ref 9–39)
BASOPHILS # BLD AUTO: 0.07 X10*3/UL (ref 0–0.1)
BASOPHILS NFR BLD AUTO: 1.1 %
BILIRUB SERPL-MCNC: 0.3 MG/DL (ref 0–1.2)
BUN SERPL-MCNC: 13 MG/DL (ref 6–23)
CALCIUM SERPL-MCNC: 8.9 MG/DL (ref 8.6–10.3)
CHLORIDE SERPL-SCNC: 98 MMOL/L (ref 98–107)
CO2 SERPL-SCNC: 30 MMOL/L (ref 21–32)
CREAT SERPL-MCNC: 0.69 MG/DL (ref 0.5–1.05)
EGFRCR SERPLBLD CKD-EPI 2021: 89 ML/MIN/1.73M*2
EOSINOPHIL # BLD AUTO: 0.21 X10*3/UL (ref 0–0.4)
EOSINOPHIL NFR BLD AUTO: 3.3 %
ERYTHROCYTE [DISTWIDTH] IN BLOOD BY AUTOMATED COUNT: 12.5 % (ref 11.5–14.5)
FERRITIN SERPL-MCNC: 60 NG/ML (ref 8–150)
GLUCOSE SERPL-MCNC: 96 MG/DL (ref 74–99)
HCT VFR BLD AUTO: 38.4 % (ref 36–46)
HGB BLD-MCNC: 12.9 G/DL (ref 12–16)
IMM GRANULOCYTES # BLD AUTO: 0.02 X10*3/UL (ref 0–0.5)
IMM GRANULOCYTES NFR BLD AUTO: 0.3 % (ref 0–0.9)
IRON SATN MFR SERPL: 20 % (ref 25–45)
IRON SERPL-MCNC: 57 UG/DL (ref 35–150)
LYMPHOCYTES # BLD AUTO: 1.63 X10*3/UL (ref 0.8–3)
LYMPHOCYTES NFR BLD AUTO: 25.2 %
MCH RBC QN AUTO: 27.5 PG (ref 26–34)
MCHC RBC AUTO-ENTMCNC: 33.6 G/DL (ref 32–36)
MCV RBC AUTO: 82 FL (ref 80–100)
MONOCYTES # BLD AUTO: 0.57 X10*3/UL (ref 0.05–0.8)
MONOCYTES NFR BLD AUTO: 8.8 %
NEUTROPHILS # BLD AUTO: 3.96 X10*3/UL (ref 1.6–5.5)
NEUTROPHILS NFR BLD AUTO: 61.3 %
NRBC BLD-RTO: 0 /100 WBCS (ref 0–0)
PLATELET # BLD AUTO: 311 X10*3/UL (ref 150–450)
POTASSIUM SERPL-SCNC: 4.1 MMOL/L (ref 3.5–5.3)
PROT SERPL-MCNC: 6.7 G/DL (ref 6.4–8.2)
RBC # BLD AUTO: 4.69 X10*6/UL (ref 4–5.2)
SODIUM SERPL-SCNC: 134 MMOL/L (ref 136–145)
TIBC SERPL-MCNC: 279 UG/DL (ref 240–445)
UIBC SERPL-MCNC: 222 UG/DL (ref 110–370)
WBC # BLD AUTO: 6.5 X10*3/UL (ref 4.4–11.3)

## 2024-11-06 PROCEDURE — 99213 OFFICE O/P EST LOW 20 MIN: CPT | Performed by: INTERNAL MEDICINE

## 2024-11-06 PROCEDURE — 1126F AMNT PAIN NOTED NONE PRSNT: CPT | Performed by: INTERNAL MEDICINE

## 2024-11-06 PROCEDURE — 36415 COLL VENOUS BLD VENIPUNCTURE: CPT

## 2024-11-06 PROCEDURE — 83540 ASSAY OF IRON: CPT

## 2024-11-06 PROCEDURE — 3077F SYST BP >= 140 MM HG: CPT | Performed by: INTERNAL MEDICINE

## 2024-11-06 PROCEDURE — 1123F ACP DISCUSS/DSCN MKR DOCD: CPT | Performed by: INTERNAL MEDICINE

## 2024-11-06 PROCEDURE — 3079F DIAST BP 80-89 MM HG: CPT | Performed by: INTERNAL MEDICINE

## 2024-11-06 PROCEDURE — 1159F MED LIST DOCD IN RCRD: CPT | Performed by: INTERNAL MEDICINE

## 2024-11-06 PROCEDURE — 82728 ASSAY OF FERRITIN: CPT | Mod: PARLAB

## 2024-11-06 PROCEDURE — 85025 COMPLETE CBC W/AUTO DIFF WBC: CPT

## 2024-11-06 PROCEDURE — 80053 COMPREHEN METABOLIC PANEL: CPT

## 2024-11-06 ASSESSMENT — PAIN SCALES - GENERAL: PAINLEVEL_OUTOF10: 0-NO PAIN

## 2024-11-06 NOTE — PROGRESS NOTES
LOCATION:  [facility] at [facility].     HEMATOLOGY & ONCOLOGY PROBLEMS:  1. Microcytic anemia.          a. Initial workup with findings suggestive of iron deficiency in .         b. Tried on oral iron therapy; d/c due to diarrhea.          c. Trial of IV iron infusion with Feraheme in Aug 2020.     CHIEF COMPLAINT:    The patient is in the clinic today for management of iron deficiency anemia.                  HISTORY:   Ms. Franklin is a 78 year old pleasant female with anemia. She initially presented to her primary care physician with complaints of generalized weakness. CBC from 2020 showed platelet counts  of 523K, hemoglobin of 4.8, and hematocrit of 19. White cell counts were all normal.  She was immediately admitted to [facility] and was supported with blood transfusion. Further workup revealed significant iron deficiency and she was supported with  IV iron infusion.  Urgent GI workup including EGD and colonoscopy was unremarkable.  She was initially tried on oral iron therapy but it was discontinued due to problem with diarrhea/constipation.  She was treated with additional IV iron infusion therapy  with Feraheme in 2020 with good response.     INTERVAL HISTORY:  Patient denies any specific new complaints other than persistent fatigue. S No history of nausea, vomiting, fever, night sweats, diarrhea, rash, anorexia or weight loss.      PAST MEDICAL HISTORY:   1. Anemia as detailed above.   2. Hypertension.   3. History of peptic ulcer disease.   4. History of meningiomas, s/p gamma-knife in .  5. Hypertension  6. Hyperlipidemia.     SOCIAL HISTORY:   She is a  and lives in [place].   in . Quit smoking in  with 1 ppd for 30 years prior smoking history.  Admits to drinking a glass of wine in the evenings. 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  Born and raised in [place].     FAMILY HISTORY:    Mother  at age  95 from stroke. Father  at age 73 from MI. Had 2 siblings, one sister  from COPD. Has 2 children and 7 grandchildren ~ all alive and well. No other specific history of bleeding, clotting or malignant disorder in the family.     REVIEW OF SYSTEMS:  Pertinent finding as per the history above. All other systems have been reviewed and generally negative and noncontributory.     ALLERGY & MEDICATIONS:  Allergies and latest outpatient medications list were reviewed in the EMR.    VITAL SIGNS  BSA: 1.5 meters squared  /88   Pulse 68   Temp 36.5 °C (97.7 °F)   Resp 20   Wt 55 kg (121 lb 4.1 oz)   SpO2 98%   BMI 25.34 kg/m²     PHYSICAL EXAMINATION:  Detailed examination not done.    LAB RESULTS:  CBC from today shows a normal hemoglobin of 12.9 with MCV of 82.  WBC, platelets and metabolic profile is normal.  Iron panel, ferritin and vitamin B12 level are pending from today but were unremarkable on 2024.     ASSESSMENT & PLAN:  1. Severe microcytic anemia.  Please refer to the details of initial presentation and management as outlined above. In summary, she  initially presented to her  primary care physician with complaints of generalized weakness. CBC from 2020 showed platelet counts of 523K, hemoglobin of 4.8, and hematocrit of 19. White cell counts were all normal.  She was immediately admitted to [facility] and was supported  with blood transfusion. Further workup revealed significant iron deficiency and she was supported with IV iron infusion.  Urgent GI workup including EGD and colonoscopy was unremarkable.  She was initially tried on oral iron therapy but it was discontinued  due to problem with diarrhea/constipation.  She was treated with additional IV iron infusion therapy with Feraheme in 2020 with good response.     Patient is reassured that overall she is doing much better.  Hemoglobin is stable around 13 today.  Iron panel and ferritin level is pending unremarkable and  may 2024.  For now we will continue to follow  her closely.  So far initial GI evaluation was unremarkable.  As stated above capsule endoscopy study could not be completed due to some complications.  My impression is that she has issues with intermittent small bowel AVMs related bleeding or similar problems.  I strongly encourage her to continue to follow with the GI team.  In the meantime, we will monitor her closely and support her with IV iron as needed.      2. Follow up:  Patient will return to the clinic in 6 months.  Advised to contact us immediately if there are any new questions or problems.     This note has been transcribed using Dragon voice recognition system and there is a possibility of unintentional typing misprints.

## 2024-11-07 DIAGNOSIS — I10 PRIMARY HYPERTENSION: ICD-10-CM

## 2024-11-07 DIAGNOSIS — M54.16 LUMBAR RADICULOPATHY: ICD-10-CM

## 2024-11-07 RX ORDER — CLONIDINE HYDROCHLORIDE 0.1 MG/1
0.1 TABLET ORAL 2 TIMES DAILY
Qty: 180 TABLET | Refills: 0 | Status: SHIPPED | OUTPATIENT
Start: 2024-11-07

## 2024-11-07 RX ORDER — GABAPENTIN 100 MG/1
100-200 CAPSULE ORAL NIGHTLY
Qty: 180 CAPSULE | Refills: 1 | Status: SHIPPED | OUTPATIENT
Start: 2024-11-07

## 2025-01-22 ENCOUNTER — APPOINTMENT (OUTPATIENT)
Dept: PRIMARY CARE | Facility: CLINIC | Age: 80
End: 2025-01-22
Payer: MEDICARE

## 2025-01-27 ENCOUNTER — APPOINTMENT (OUTPATIENT)
Dept: PRIMARY CARE | Facility: CLINIC | Age: 80
End: 2025-01-27
Payer: MEDICARE

## 2025-01-27 VITALS
OXYGEN SATURATION: 95 % | HEART RATE: 62 BPM | TEMPERATURE: 97.9 F | SYSTOLIC BLOOD PRESSURE: 133 MMHG | BODY MASS INDEX: 26.35 KG/M2 | RESPIRATION RATE: 16 BRPM | DIASTOLIC BLOOD PRESSURE: 86 MMHG | WEIGHT: 125.5 LBS | HEIGHT: 58 IN

## 2025-01-27 DIAGNOSIS — R53.83 FATIGUE, UNSPECIFIED TYPE: ICD-10-CM

## 2025-01-27 DIAGNOSIS — E61.1 LOW IRON: ICD-10-CM

## 2025-01-27 DIAGNOSIS — Z00.00 ROUTINE GENERAL MEDICAL EXAMINATION AT A HEALTH CARE FACILITY: ICD-10-CM

## 2025-01-27 DIAGNOSIS — Z12.31 ENCOUNTER FOR SCREENING MAMMOGRAM FOR MALIGNANT NEOPLASM OF BREAST: ICD-10-CM

## 2025-01-27 DIAGNOSIS — E78.5 BORDERLINE HYPERLIPIDEMIA: ICD-10-CM

## 2025-01-27 DIAGNOSIS — Z00.00 MEDICARE ANNUAL WELLNESS VISIT, SUBSEQUENT: Primary | ICD-10-CM

## 2025-01-27 DIAGNOSIS — I10 ESSENTIAL HYPERTENSION, BENIGN: ICD-10-CM

## 2025-01-27 DIAGNOSIS — Z23 NEED FOR INFLUENZA VACCINATION: ICD-10-CM

## 2025-01-27 PROBLEM — R06.02 EXERTIONAL SHORTNESS OF BREATH: Status: RESOLVED | Noted: 2023-04-10 | Resolved: 2025-01-27

## 2025-01-27 PROCEDURE — G0008 ADMIN INFLUENZA VIRUS VAC: HCPCS | Performed by: INTERNAL MEDICINE

## 2025-01-27 PROCEDURE — 1159F MED LIST DOCD IN RCRD: CPT | Performed by: INTERNAL MEDICINE

## 2025-01-27 PROCEDURE — 1160F RVW MEDS BY RX/DR IN RCRD: CPT | Performed by: INTERNAL MEDICINE

## 2025-01-27 PROCEDURE — 3075F SYST BP GE 130 - 139MM HG: CPT | Performed by: INTERNAL MEDICINE

## 2025-01-27 PROCEDURE — 1036F TOBACCO NON-USER: CPT | Performed by: INTERNAL MEDICINE

## 2025-01-27 PROCEDURE — 1123F ACP DISCUSS/DSCN MKR DOCD: CPT | Performed by: INTERNAL MEDICINE

## 2025-01-27 PROCEDURE — 1170F FXNL STATUS ASSESSED: CPT | Performed by: INTERNAL MEDICINE

## 2025-01-27 PROCEDURE — G0439 PPPS, SUBSEQ VISIT: HCPCS | Performed by: INTERNAL MEDICINE

## 2025-01-27 PROCEDURE — 99397 PER PM REEVAL EST PAT 65+ YR: CPT | Performed by: INTERNAL MEDICINE

## 2025-01-27 PROCEDURE — 3079F DIAST BP 80-89 MM HG: CPT | Performed by: INTERNAL MEDICINE

## 2025-01-27 PROCEDURE — 90662 IIV NO PRSV INCREASED AG IM: CPT | Performed by: INTERNAL MEDICINE

## 2025-01-27 PROCEDURE — 1158F ADVNC CARE PLAN TLK DOCD: CPT | Performed by: INTERNAL MEDICINE

## 2025-01-27 RX ORDER — ALPHA LIPOIC ACID 300 MG
300 CAPSULE ORAL DAILY
COMMUNITY

## 2025-01-27 RX ORDER — MULTIVITAMIN/IRON/FOLIC ACID 18MG-0.4MG
1 TABLET ORAL DAILY
COMMUNITY

## 2025-01-27 ASSESSMENT — ENCOUNTER SYMPTOMS
ACTIVITY CHANGE: 0
NECK STIFFNESS: 0
VOICE CHANGE: 0
FACIAL SWELLING: 0
SORE THROAT: 0
CONSTIPATION: 0
SPEECH DIFFICULTY: 0
COLOR CHANGE: 0
ABDOMINAL DISTENTION: 0
VOMITING: 0
POLYDIPSIA: 0
FEVER: 0
DIARRHEA: 0
CHILLS: 0
STRIDOR: 0
FLANK PAIN: 0
ABDOMINAL PAIN: 0
APNEA: 0
CHEST TIGHTNESS: 0
JOINT SWELLING: 0
CONFUSION: 0
HALLUCINATIONS: 0
DYSPHORIC MOOD: 0
SINUS PAIN: 0
WHEEZING: 0
EYE REDNESS: 0
NECK PAIN: 0
MYALGIAS: 0
SHORTNESS OF BREATH: 0
SLEEP DISTURBANCE: 0
HEMATURIA: 0
NAUSEA: 0
SEIZURES: 0
OCCASIONAL FEELINGS OF UNSTEADINESS: 0
RHINORRHEA: 0
PALPITATIONS: 0
EYE PAIN: 0
SINUS PRESSURE: 0
DYSURIA: 0
LIGHT-HEADEDNESS: 0
AGITATION: 0
TROUBLE SWALLOWING: 0
NERVOUS/ANXIOUS: 0
HEADACHES: 0
DIAPHORESIS: 0
WOUND: 0
BRUISES/BLEEDS EASILY: 0
WEAKNESS: 0
FREQUENCY: 0
FATIGUE: 0
APPETITE CHANGE: 0
TREMORS: 0
CHOKING: 0
COUGH: 0
DECREASED CONCENTRATION: 0
EYE ITCHING: 0
ANAL BLEEDING: 0
PHOTOPHOBIA: 0
NUMBNESS: 0
LOSS OF SENSATION IN FEET: 0
HYPERACTIVE: 0
EYE DISCHARGE: 0
ARTHRALGIAS: 0
FACIAL ASYMMETRY: 0
DIZZINESS: 0
DIFFICULTY URINATING: 0
BLOOD IN STOOL: 0
UNEXPECTED WEIGHT CHANGE: 0
DEPRESSION: 0
ADENOPATHY: 0
POLYPHAGIA: 0
RECTAL PAIN: 0
BACK PAIN: 0

## 2025-01-27 ASSESSMENT — ACTIVITIES OF DAILY LIVING (ADL)
GROCERY_SHOPPING: INDEPENDENT
BATHING: INDEPENDENT
TAKING_MEDICATION: INDEPENDENT
MANAGING_FINANCES: INDEPENDENT
DOING_HOUSEWORK: INDEPENDENT
DRESSING: INDEPENDENT

## 2025-01-27 ASSESSMENT — PATIENT HEALTH QUESTIONNAIRE - PHQ9
1. LITTLE INTEREST OR PLEASURE IN DOING THINGS: NOT AT ALL
SUM OF ALL RESPONSES TO PHQ9 QUESTIONS 1 AND 2: 0
2. FEELING DOWN, DEPRESSED OR HOPELESS: NOT AT ALL

## 2025-01-27 NOTE — PATIENT INSTRUCTIONS
Continue all medications as directed-call when refills needed  Get blood work done today and urine   You got your flu shot today   Call and schedule your mammogram-order is in   Continue healthy diet and exercise/be as active as able  Devote good amount of time to sleep-have someone monitor for apneic spells (stop breathing when sleeping)-we may need to do a sleep study  Follow up here in 6 months

## 2025-01-27 NOTE — ASSESSMENT & PLAN NOTE
Adequately controlled on current regimen    Orders:    Follow Up In Primary Care - Medicare Annual    Basic Metabolic Panel; Future    Urinalysis with Reflex Microscopic; Future    Follow Up In Primary Care - [facility]; Future

## 2025-01-27 NOTE — ASSESSMENT & PLAN NOTE
LDL was <100 when tested in 2024  Continue healthy diet   Orders:    Follow Up In Primary Care - Medicare Annual

## 2025-01-27 NOTE — PROGRESS NOTES
Subjective   Reason for Visit: Valerie Franklin is an 79 y.o. female here for a Medicare Wellness visit.     Past Medical, Surgical, and Family History reviewed and updated in chart.    Reviewed all medications by prescribing practitioner or clinical pharmacist (such as prescriptions, OTCs, herbal therapies and supplements) and documented in the medical record.    HPI  Pt here for MWV.  Her weight is stable.  She is active and appetite is good.      She had normal mammogram in 1/2024.  No breast issues.      She had normal colonoscopy in 2020.  She will repeat if able in 10 years.      She had normal dexa in 2024.  No falls.      She is taking her meds as directed.  She is on supplements as well.  She started alpha-lipoic acid.    Patient Care Team:  Alysia HOWE DO as PCP - General  Alysia HOWE DO as PCP - [employer] PCP  Rashawn Moore MD as Consulting Physician (Hematology and Oncology)     Review of Systems   Constitutional:  Negative for activity change, appetite change, chills, diaphoresis, fatigue, fever and unexpected weight change.   HENT:  Negative for congestion, dental problem, drooling, ear discharge, ear pain, facial swelling, hearing loss, mouth sores, nosebleeds, postnasal drip, rhinorrhea, sinus pressure, sinus pain, sneezing, sore throat, tinnitus, trouble swallowing and voice change.         +dry mouth   Eyes:  Positive for visual disturbance (can have double vision when driving at times). Negative for photophobia, pain, discharge, redness and itching.   Respiratory:  Negative for apnea, cough, choking, chest tightness, shortness of breath, wheezing and stridor.    Cardiovascular:  Negative for chest pain, palpitations and leg swelling.   Gastrointestinal:  Negative for abdominal distention, abdominal pain, anal bleeding, blood in stool, constipation, diarrhea, nausea, rectal pain and vomiting.   Endocrine: Negative for cold intolerance, heat intolerance, polydipsia, polyphagia  "and polyuria.   Genitourinary:  Negative for decreased urine volume, difficulty urinating, dyspareunia, dysuria, enuresis, flank pain, frequency, genital sores, hematuria, menstrual problem, pelvic pain, urgency, vaginal bleeding, vaginal discharge and vaginal pain.   Musculoskeletal:  Negative for arthralgias, back pain, gait problem, joint swelling, myalgias, neck pain and neck stiffness.   Skin:  Negative for color change, pallor, rash and wound.   Allergic/Immunologic: Negative for environmental allergies, food allergies and immunocompromised state.   Neurological:  Negative for dizziness, tremors, seizures, syncope, facial asymmetry, speech difficulty, weakness, light-headedness, numbness and headaches.   Hematological:  Negative for adenopathy. Does not bruise/bleed easily.   Psychiatric/Behavioral:  Negative for agitation, behavioral problems, confusion, decreased concentration, dysphoric mood, hallucinations, self-injury, sleep disturbance and suicidal ideas. The patient is not nervous/anxious and is not hyperactive.        Objective   Vitals:  /86 (BP Location: Right arm, Patient Position: Sitting)   Pulse 62   Temp 36.6 °C (97.9 °F) (Oral)   Resp 16   Ht 1.473 m (4' 10\")   Wt 56.9 kg (125 lb 8 oz)   SpO2 95%   BMI 26.23 kg/m²       Physical Exam  Constitutional:       Appearance: Normal appearance.   HENT:      Head: Normocephalic and atraumatic.      Right Ear: Tympanic membrane, ear canal and external ear normal. There is no impacted cerumen.      Left Ear: Tympanic membrane, ear canal and external ear normal. There is no impacted cerumen.      Nose: Nose normal. No congestion or rhinorrhea.      Mouth/Throat:      Mouth: Mucous membranes are moist.      Pharynx: Oropharynx is clear. No oropharyngeal exudate or posterior oropharyngeal erythema.   Eyes:      Extraocular Movements: Extraocular movements intact.      Conjunctiva/sclera: Conjunctivae normal.      Pupils: Pupils are equal, round, " and reactive to light.   Neck:      Vascular: No carotid bruit.   Cardiovascular:      Rate and Rhythm: Normal rate and regular rhythm.      Pulses: Normal pulses.      Heart sounds: Normal heart sounds. No murmur heard.  Pulmonary:      Effort: Pulmonary effort is normal. No respiratory distress.      Breath sounds: Normal breath sounds. No wheezing, rhonchi or rales.   Abdominal:      General: Abdomen is flat. Bowel sounds are normal. There is no distension.      Palpations: Abdomen is soft.      Tenderness: There is no abdominal tenderness.      Hernia: No hernia is present.   Musculoskeletal:         General: Tenderness present. No swelling. Normal range of motion.      Cervical back: Normal range of motion and neck supple.      Right lower leg: No edema.      Left lower leg: No edema.      Comments: Pain low back with going from supine position to sitting upright    Lymphadenopathy:      Cervical: No cervical adenopathy.   Skin:     General: Skin is warm and dry.      Findings: No lesion or rash.   Neurological:      General: No focal deficit present.      Mental Status: She is alert and oriented to person, place, and time.      Cranial Nerves: No cranial nerve deficit.      Sensory: No sensory deficit.      Motor: No weakness.   Psychiatric:         Mood and Affect: Mood normal.         Behavior: Behavior normal.         Thought Content: Thought content normal.         Judgment: Judgment normal.         Assessment & Plan  Essential hypertension, benign  Adequately controlled on current regimen    Orders:    Follow Up In Primary Care - Medicare Annual    Basic Metabolic Panel; Future    Urinalysis with Reflex Microscopic; Future    Follow Up In Primary Care - Established; Future    Borderline hyperlipidemia  LDL was <100 when tested in 2024  Continue healthy diet   Orders:    Follow Up In Primary Care - Medicare Annual    Need for influenza vaccination    Orders:    Flu vaccine, trivalent, preservative free,  HIGH-DOSE, age 65y+ (Fluzone)    Routine general medical examination at a health care facility         Medicare annual wellness visit, subsequent  Discussed DNR-pt wishes to remain full code at this time  Pt declines other vaccines at this time         Fatigue, unspecified type  Pt feels more fatigued of late  Will do blood work  May need sleep study as she does admit to snoring but unaware of apneic spells   Orders:    TSH with reflex to Free T4 if abnormal; Future    CBC; Future    Vitamin B12; Future    Follow Up In Primary Care - Established; Future    Low iron    Orders:    Iron and TIBC; Future    Ferritin; Future    Encounter for screening mammogram for malignant neoplasm of breast    Orders:    BI mammo bilateral screening tomosynthesis; Future           Advance Directives Discussion  16 - 20 minutes were spent discussing Advanced Care Planning (including a Living Will, Medical Power Of , as well as specific end of life choices and/or directives). The details of that discussion were documented in Advanced Directives Discussion section of the medical record.

## 2025-01-28 ENCOUNTER — TELEPHONE (OUTPATIENT)
Dept: PRIMARY CARE | Facility: CLINIC | Age: 80
End: 2025-01-28
Payer: MEDICARE

## 2025-01-28 LAB
ANION GAP SERPL CALCULATED.4IONS-SCNC: 12 MMOL/L (CALC) (ref 7–17)
BUN SERPL-MCNC: 11 MG/DL (ref 7–25)
BUN/CREAT SERPL: ABNORMAL (CALC) (ref 6–22)
CALCIUM SERPL-MCNC: 9.4 MG/DL (ref 8.6–10.4)
CHLORIDE SERPL-SCNC: 99 MMOL/L (ref 98–110)
CO2 SERPL-SCNC: 25 MMOL/L (ref 20–32)
CREAT SERPL-MCNC: 0.68 MG/DL (ref 0.6–1)
EGFRCR SERPLBLD CKD-EPI 2021: 89 ML/MIN/1.73M2
ERYTHROCYTE [DISTWIDTH] IN BLOOD BY AUTOMATED COUNT: 13.3 % (ref 11–15)
FERRITIN SERPL-MCNC: 19 NG/ML (ref 16–288)
GLUCOSE SERPL-MCNC: 102 MG/DL (ref 65–99)
HCT VFR BLD AUTO: 41.7 % (ref 35–45)
HGB BLD-MCNC: 13.6 G/DL (ref 11.7–15.5)
IRON SATN MFR SERPL: 21 % (CALC) (ref 16–45)
IRON SERPL-MCNC: 73 MCG/DL (ref 45–160)
MCH RBC QN AUTO: 27.4 PG (ref 27–33)
MCHC RBC AUTO-ENTMCNC: 32.6 G/DL (ref 32–36)
MCV RBC AUTO: 83.9 FL (ref 80–100)
PLATELET # BLD AUTO: 372 THOUSAND/UL (ref 140–400)
PMV BLD REES-ECKER: 8.5 FL (ref 7.5–12.5)
POTASSIUM SERPL-SCNC: 4.6 MMOL/L (ref 3.5–5.3)
RBC # BLD AUTO: 4.97 MILLION/UL (ref 3.8–5.1)
SODIUM SERPL-SCNC: 136 MMOL/L (ref 135–146)
TIBC SERPL-MCNC: 355 MCG/DL (CALC) (ref 250–450)
TSH SERPL-ACNC: 3.19 MIU/L (ref 0.4–4.5)
VIT B12 SERPL-MCNC: 484 PG/ML (ref 200–1100)
WBC # BLD AUTO: 5.6 THOUSAND/UL (ref 3.8–10.8)

## 2025-01-28 NOTE — TELEPHONE ENCOUNTER
"I called and spoke with patient regarding results. Patient understands- No other questions or concerns noted at this time. I encouraged patient we should do sleep study if fatigue continues, patient states \"she can and will not sleep with c-pap.\"  "

## 2025-01-28 NOTE — TELEPHONE ENCOUNTER
----- Message from Alysia Perea sent at 1/28/2025  4:05 PM EST -----  Labs are normal; have patient consider sleep study if she continues to be fatigued    0 = understands/communicates without difficulty

## 2025-02-07 DIAGNOSIS — I10 PRIMARY HYPERTENSION: ICD-10-CM

## 2025-02-07 RX ORDER — CLONIDINE HYDROCHLORIDE 0.1 MG/1
0.1 TABLET ORAL 2 TIMES DAILY
Qty: 180 TABLET | Refills: 1 | Status: SHIPPED | OUTPATIENT
Start: 2025-02-07

## 2025-02-18 ENCOUNTER — HOSPITAL ENCOUNTER (OUTPATIENT)
Dept: RADIOLOGY | Facility: CLINIC | Age: 80
Discharge: HOME | End: 2025-02-18
Payer: MEDICARE

## 2025-02-18 DIAGNOSIS — Z12.31 ENCOUNTER FOR SCREENING MAMMOGRAM FOR MALIGNANT NEOPLASM OF BREAST: ICD-10-CM

## 2025-02-18 PROCEDURE — 77067 SCR MAMMO BI INCL CAD: CPT | Performed by: STUDENT IN AN ORGANIZED HEALTH CARE EDUCATION/TRAINING PROGRAM

## 2025-02-18 PROCEDURE — 77063 BREAST TOMOSYNTHESIS BI: CPT | Performed by: STUDENT IN AN ORGANIZED HEALTH CARE EDUCATION/TRAINING PROGRAM

## 2025-02-18 PROCEDURE — 77067 SCR MAMMO BI INCL CAD: CPT

## 2025-02-19 LAB
APPEARANCE UR: CLEAR
BILIRUB UR QL STRIP: NEGATIVE
COLOR UR: YELLOW
GLUCOSE UR QL STRIP: NEGATIVE
HGB UR QL STRIP: NEGATIVE
KETONES UR QL STRIP: NEGATIVE
LEUKOCYTE ESTERASE UR QL STRIP: NEGATIVE
NITRITE UR QL STRIP: NEGATIVE
PH UR STRIP: 7.5 [PH] (ref 5–8)
PROT UR QL STRIP: NEGATIVE
SP GR UR STRIP: 1.01 (ref 1–1.03)

## 2025-02-28 DIAGNOSIS — I10 PRIMARY HYPERTENSION: ICD-10-CM

## 2025-02-28 RX ORDER — AMLODIPINE BESYLATE 5 MG/1
5 TABLET ORAL DAILY
Qty: 90 TABLET | Refills: 1 | Status: SHIPPED | OUTPATIENT
Start: 2025-02-28

## 2025-03-10 ENCOUNTER — TELEPHONE (OUTPATIENT)
Dept: PRIMARY CARE | Facility: CLINIC | Age: 80
End: 2025-03-10
Payer: MEDICARE

## 2025-03-10 DIAGNOSIS — R21 RASH AND NONSPECIFIC SKIN ERUPTION: ICD-10-CM

## 2025-03-10 RX ORDER — TRIAMCINOLONE ACETONIDE 1 MG/G
OINTMENT TOPICAL 2 TIMES DAILY PRN
Qty: 30 G | Refills: 0 | Status: SHIPPED | OUTPATIENT
Start: 2025-03-10 | End: 2025-07-08

## 2025-03-10 NOTE — TELEPHONE ENCOUNTER
Requests refill of 0.1% triamcinolone acetonide ointment but med not on active med list, please advise

## 2025-03-20 DIAGNOSIS — K21.9 GASTROESOPHAGEAL REFLUX DISEASE WITHOUT ESOPHAGITIS: ICD-10-CM

## 2025-03-20 RX ORDER — ESOMEPRAZOLE MAGNESIUM 40 MG/1
40 CAPSULE, DELAYED RELEASE ORAL DAILY
Qty: 90 CAPSULE | Refills: 1 | Status: SHIPPED | OUTPATIENT
Start: 2025-03-20

## 2025-04-15 DIAGNOSIS — I10 PRIMARY HYPERTENSION: ICD-10-CM

## 2025-04-15 DIAGNOSIS — F41.9 ANXIETY: ICD-10-CM

## 2025-04-15 RX ORDER — LOSARTAN POTASSIUM 100 MG/1
100 TABLET ORAL DAILY
Qty: 90 TABLET | Refills: 1 | Status: SHIPPED | OUTPATIENT
Start: 2025-04-15

## 2025-04-15 RX ORDER — ESCITALOPRAM OXALATE 10 MG/1
TABLET ORAL
Qty: 90 TABLET | Refills: 1 | Status: SHIPPED | OUTPATIENT
Start: 2025-04-15

## 2025-04-29 ENCOUNTER — TELEPHONE (OUTPATIENT)
Dept: HEMATOLOGY/ONCOLOGY | Facility: CLINIC | Age: 80
End: 2025-04-29
Payer: MEDICARE

## 2025-04-29 NOTE — TELEPHONE ENCOUNTER
Reason for Conversation  called to remind patient to get labs prior    Background   I called and spoke to patient to come in and have her labs done prior to her appointment. She told me will come to our lab on the 1st to get them done so Dr. Moore has them for her appointment.     Disposition   No disposition on file.

## 2025-05-01 ENCOUNTER — LAB (OUTPATIENT)
Dept: LAB | Facility: CLINIC | Age: 80
End: 2025-05-01
Payer: MEDICARE

## 2025-05-01 DIAGNOSIS — D50.9 MICROCYTIC ANEMIA: ICD-10-CM

## 2025-05-01 LAB
ALBUMIN SERPL BCP-MCNC: 4.2 G/DL (ref 3.4–5)
ALP SERPL-CCNC: 76 U/L (ref 33–136)
ALT SERPL W P-5'-P-CCNC: 13 U/L (ref 7–45)
ANION GAP SERPL CALC-SCNC: 11 MMOL/L (ref 10–20)
AST SERPL W P-5'-P-CCNC: 15 U/L (ref 9–39)
BASOPHILS # BLD AUTO: 0.08 X10*3/UL (ref 0–0.1)
BASOPHILS NFR BLD AUTO: 1.3 %
BILIRUB SERPL-MCNC: 0.3 MG/DL (ref 0–1.2)
BUN SERPL-MCNC: 12 MG/DL (ref 6–23)
CALCIUM SERPL-MCNC: 9.3 MG/DL (ref 8.6–10.3)
CHLORIDE SERPL-SCNC: 99 MMOL/L (ref 98–107)
CO2 SERPL-SCNC: 30 MMOL/L (ref 21–32)
CREAT SERPL-MCNC: 0.68 MG/DL (ref 0.5–1.05)
EGFRCR SERPLBLD CKD-EPI 2021: 89 ML/MIN/1.73M*2
EOSINOPHIL # BLD AUTO: 0.28 X10*3/UL (ref 0–0.4)
EOSINOPHIL NFR BLD AUTO: 4.4 %
ERYTHROCYTE [DISTWIDTH] IN BLOOD BY AUTOMATED COUNT: 13.5 % (ref 11.5–14.5)
FERRITIN SERPL-MCNC: 36 NG/ML (ref 8–150)
GLUCOSE SERPL-MCNC: 91 MG/DL (ref 74–99)
HCT VFR BLD AUTO: 40.9 % (ref 36–46)
HGB BLD-MCNC: 13.1 G/DL (ref 12–16)
IMM GRANULOCYTES # BLD AUTO: 0.03 X10*3/UL (ref 0–0.5)
IMM GRANULOCYTES NFR BLD AUTO: 0.5 % (ref 0–0.9)
IRON SATN MFR SERPL: 17 % (ref 25–45)
IRON SERPL-MCNC: 52 UG/DL (ref 35–150)
LYMPHOCYTES # BLD AUTO: 1.67 X10*3/UL (ref 0.8–3)
LYMPHOCYTES NFR BLD AUTO: 26.2 %
MCH RBC QN AUTO: 26.6 PG (ref 26–34)
MCHC RBC AUTO-ENTMCNC: 32 G/DL (ref 32–36)
MCV RBC AUTO: 83 FL (ref 80–100)
MONOCYTES # BLD AUTO: 0.51 X10*3/UL (ref 0.05–0.8)
MONOCYTES NFR BLD AUTO: 8 %
NEUTROPHILS # BLD AUTO: 3.81 X10*3/UL (ref 1.6–5.5)
NEUTROPHILS NFR BLD AUTO: 59.6 %
NRBC BLD-RTO: 0 /100 WBCS (ref 0–0)
PLATELET # BLD AUTO: 316 X10*3/UL (ref 150–450)
POTASSIUM SERPL-SCNC: 4.2 MMOL/L (ref 3.5–5.3)
PROT SERPL-MCNC: 6.8 G/DL (ref 6.4–8.2)
RBC # BLD AUTO: 4.92 X10*6/UL (ref 4–5.2)
SODIUM SERPL-SCNC: 136 MMOL/L (ref 136–145)
TIBC SERPL-MCNC: 314 UG/DL (ref 240–445)
UIBC SERPL-MCNC: 262 UG/DL (ref 110–370)
VIT B12 SERPL-MCNC: 571 PG/ML (ref 211–911)
WBC # BLD AUTO: 6.4 X10*3/UL (ref 4.4–11.3)

## 2025-05-01 PROCEDURE — 83540 ASSAY OF IRON: CPT

## 2025-05-01 PROCEDURE — 83550 IRON BINDING TEST: CPT

## 2025-05-01 PROCEDURE — 36415 COLL VENOUS BLD VENIPUNCTURE: CPT

## 2025-05-01 PROCEDURE — 80053 COMPREHEN METABOLIC PANEL: CPT

## 2025-05-01 PROCEDURE — 85025 COMPLETE CBC W/AUTO DIFF WBC: CPT

## 2025-05-01 PROCEDURE — 82728 ASSAY OF FERRITIN: CPT | Mod: PARLAB

## 2025-05-01 PROCEDURE — 82607 VITAMIN B-12: CPT | Mod: PARLAB

## 2025-05-05 ENCOUNTER — APPOINTMENT (OUTPATIENT)
Dept: OBSTETRICS AND GYNECOLOGY | Facility: CLINIC | Age: 80
End: 2025-05-05
Payer: MEDICARE

## 2025-05-07 ENCOUNTER — OFFICE VISIT (OUTPATIENT)
Dept: HEMATOLOGY/ONCOLOGY | Facility: CLINIC | Age: 80
End: 2025-05-07
Payer: MEDICARE

## 2025-05-07 VITALS
HEART RATE: 71 BPM | SYSTOLIC BLOOD PRESSURE: 143 MMHG | WEIGHT: 123.46 LBS | DIASTOLIC BLOOD PRESSURE: 87 MMHG | TEMPERATURE: 97.9 F | OXYGEN SATURATION: 97 % | RESPIRATION RATE: 20 BRPM | BODY MASS INDEX: 25.8 KG/M2

## 2025-05-07 DIAGNOSIS — D50.9 MICROCYTIC ANEMIA: Primary | ICD-10-CM

## 2025-05-07 DIAGNOSIS — R53.82 CHRONIC FATIGUE: ICD-10-CM

## 2025-05-07 PROCEDURE — 99213 OFFICE O/P EST LOW 20 MIN: CPT | Performed by: INTERNAL MEDICINE

## 2025-05-07 PROCEDURE — 1159F MED LIST DOCD IN RCRD: CPT | Performed by: INTERNAL MEDICINE

## 2025-05-07 PROCEDURE — 3079F DIAST BP 80-89 MM HG: CPT | Performed by: INTERNAL MEDICINE

## 2025-05-07 PROCEDURE — 1126F AMNT PAIN NOTED NONE PRSNT: CPT | Performed by: INTERNAL MEDICINE

## 2025-05-07 PROCEDURE — 3077F SYST BP >= 140 MM HG: CPT | Performed by: INTERNAL MEDICINE

## 2025-05-07 ASSESSMENT — PAIN SCALES - GENERAL: PAINLEVEL_OUTOF10: 0-NO PAIN

## 2025-05-07 NOTE — PROGRESS NOTES
LOCATION:  [facility].     HEMATOLOGY & ONCOLOGY PROBLEMS:  1. Microcytic anemia.          a. Initial workup with findings suggestive of iron deficiency in .         b. Tried on oral iron therapy; d/c due to diarrhea.          c. Trial of IV iron infusion with Feraheme in Aug 2020.     CHIEF COMPLAINT:    The patient is in the clinic today for management of iron deficiency anemia.                  HISTORY:   Ms. Franklin is a 79 year old pleasant female with anemia. She initially presented to her primary care physician with complaints of generalized weakness. CBC from 2020 showed platelet counts  of 523K, hemoglobin of 4.8, and hematocrit of 19. White cell counts were all normal.  She was immediately admitted to [facility] and was supported with blood transfusion. Further workup revealed significant iron deficiency and she was supported with  IV iron infusion.  Urgent GI workup including EGD and colonoscopy was unremarkable.  She was initially tried on oral iron therapy but it was discontinued due to problem with diarrhea/constipation.  She was treated with additional IV iron infusion therapy  with Feraheme in Aug 2020 with good response.     INTERVAL HISTORY:  Patient denies any specific new complaints other than persistent fatigue.  No history of nausea, vomiting, fever, night sweats, diarrhea, rash, anorexia or weight loss.      PAST MEDICAL HISTORY:   1. Anemia as detailed above.   2. Hypertension.   3. History of peptic ulcer disease.   4. History of meningiomas, s/p gamma-knife in .  5. Hypertension  6. Hyperlipidemia.     SOCIAL HISTORY:   She is a  and lives in [place].   in . Quit smoking in  with 1 ppd for 30 years prior smoking history.  Admits to drinking a glass of wine in the evenings. 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  Born and raised in [place].     FAMILY HISTORY:    Mother  at age 95  from stroke. Father  at age 73 from MI. Had 2 siblings, one sister  from COPD. Has 2 children and 7 grandchildren ~ all alive and well. No other specific history of bleeding, clotting or malignant disorder in the family.     REVIEW OF SYSTEMS:  Pertinent finding as per the history above. All other systems have been reviewed and generally negative and noncontributory.     ALLERGY & MEDICATIONS:  Allergies and latest outpatient medications list were reviewed in the EMR.    VITAL SIGNS  BSA: 1.51 meters squared  /87   Pulse 71   Temp 36.6 °C (97.9 °F)   Resp 20   Wt 56 kg (123 lb 7.3 oz)   SpO2 97%   BMI 25.80 kg/m²     PHYSICAL EXAMINATION:  Detailed examination not done.    LAB RESULTS:  CBC from 2025 showed a normal hemoglobin of 13.1 with MCV of 83.  WBC, platelets and metabolic profile was normal. Vitamin B12, Iron panel and ferritin were unremarkable .     ASSESSMENT & PLAN:  1. Severe microcytic anemia.  Please refer to the details of initial presentation and management as outlined above. In summary, she  initially presented to her  primary care physician with complaints of generalized weakness. CBC from 2020 showed platelet counts of 523K, hemoglobin of 4.8, and hematocrit of 19. White cell counts were all normal.  She was immediately admitted to [facility] and was supported  with blood transfusion. Further workup revealed significant iron deficiency and she was supported with IV iron infusion.  Urgent GI workup including EGD and colonoscopy was unremarkable.  She was initially tried on oral iron therapy but it was discontinued  due to problem with diarrhea/constipation.  She was treated with additional IV iron infusion therapy with Feraheme in 2020 with good response.     Patient is reassured that overall she is doing much better.  Hemoglobin is stable around 13 lately.  Iron panel and ferritin level are essentially unremarkable (lower side of normal range).  For now  we will continue to follow  her closely.  So far initial GI evaluation was unremarkable.  As stated above capsule endoscopy study could not be completed due to some complications.  My impression is that she has issues with intermittent small bowel AVMs related bleeding or similar problems.  I strongly encourage her to continue to follow with the GI team.  In the meantime, we will monitor her closely and support her with IV iron as needed.      2. Follow up:  Patient will return to the clinic in 6 months.  Advised to contact us immediately if there are any new questions or problems.     This note has been transcribed using Dragon voice recognition system and there is a possibility of unintentional typing misprints.

## 2025-05-12 ENCOUNTER — OFFICE VISIT (OUTPATIENT)
Dept: OBSTETRICS AND GYNECOLOGY | Facility: CLINIC | Age: 80
End: 2025-05-12
Payer: MEDICARE

## 2025-05-12 VITALS
HEIGHT: 58 IN | BODY MASS INDEX: 26.36 KG/M2 | WEIGHT: 125.6 LBS | HEART RATE: 87 BPM | DIASTOLIC BLOOD PRESSURE: 91 MMHG | SYSTOLIC BLOOD PRESSURE: 151 MMHG

## 2025-05-12 DIAGNOSIS — N39.9 URINARY DISORDER: Primary | ICD-10-CM

## 2025-05-12 LAB
POC APPEARANCE, URINE: CLEAR
POC BILIRUBIN, URINE: NEGATIVE
POC BLOOD, URINE: ABNORMAL
POC COLOR, URINE: YELLOW
POC GLUCOSE, URINE: NEGATIVE MG/DL
POC KETONES, URINE: NEGATIVE MG/DL
POC LEUKOCYTES, URINE: NEGATIVE
POC NITRITE,URINE: NEGATIVE
POC PH, URINE: 6 PH
POC PROTEIN, URINE: NEGATIVE MG/DL
POC SPECIFIC GRAVITY, URINE: 1.01
POC UROBILINOGEN, URINE: 0.2 EU/DL

## 2025-05-12 PROCEDURE — 1126F AMNT PAIN NOTED NONE PRSNT: CPT | Performed by: OBSTETRICS & GYNECOLOGY

## 2025-05-12 PROCEDURE — 3080F DIAST BP >= 90 MM HG: CPT | Performed by: OBSTETRICS & GYNECOLOGY

## 2025-05-12 PROCEDURE — 3077F SYST BP >= 140 MM HG: CPT | Performed by: OBSTETRICS & GYNECOLOGY

## 2025-05-12 PROCEDURE — 1159F MED LIST DOCD IN RCRD: CPT | Performed by: OBSTETRICS & GYNECOLOGY

## 2025-05-12 PROCEDURE — 51798 US URINE CAPACITY MEASURE: CPT | Performed by: OBSTETRICS & GYNECOLOGY

## 2025-05-12 PROCEDURE — 99213 OFFICE O/P EST LOW 20 MIN: CPT | Mod: 25 | Performed by: OBSTETRICS & GYNECOLOGY

## 2025-05-12 PROCEDURE — 81003 URINALYSIS AUTO W/O SCOPE: CPT | Mod: QW | Performed by: OBSTETRICS & GYNECOLOGY

## 2025-05-12 PROCEDURE — 99213 OFFICE O/P EST LOW 20 MIN: CPT | Performed by: OBSTETRICS & GYNECOLOGY

## 2025-05-12 ASSESSMENT — PAIN SCALES - GENERAL: PAINLEVEL_OUTOF10: 0-NO PAIN

## 2025-05-12 NOTE — PROGRESS NOTES
Urogynecology  Provider:  Seema Moore MD  [phone]        ASSESSMENT AND PLAN:   79 y.o. female being assessed for an annual GYN exam. Co morbidities: HLD, HTN.    Diagnoses:   #1 Annual GYN exam    Plan:   1. Annual GYN exam  - S/p D&C, [place] colpocleisis, transobturator sling, perineorrhaphy, and cystoscopy on 11/05/21.   - She had a normal MMG on 2/18/25.   - She is doing well with no other complaints.     2. Recent fall today   - Examined her mouth and face.   - Advised the patient to apply ice to the area to reduce swelling. If bleeding increases, the patient should seek urgent care or visit the ED.      Follow-up in 1 year with Dr. Moore.    Scribe Attestation:   ISeema, am scribing for virtually, and in the presence of Seema Moore MD on 05/12/2025 at 8:13 PM.     Agree with above. I Dr. Moore, personally performed the services described in the documentation which was scribed virtually and confirm it is both complete and accurate.  Seema Moore MD        Problem List Items Addressed This Visit    None          I spent a total of eConsult Time: 22 minutes in face to face and non face to face time.        Seema Moore MD        HISTORY OF PRESENT ILLNESS:       Record Review:   Last visit 5/2024  77 y/o patient presenting for a Well Woman exam. Comorbidities include: HLD, HTN.     Diagnoses:  #1 Well Woman Exam     Plan:  Well Woman Exam  - Upon examination, the patient has a well-healed colpocleisis and no prolapse. She also had a normal rectal exam and no masses.     Return in 1 year for a follow-up with Dr. Moore.     Interval History:  - Reports tripping over a part of the sidewalk that was uneven, resulting in a fall on the way over. She cut the inside of her lip. Last year, she had a fall and broke her foot.   - She still gets her mammograms.     Past Medical History:      Medical History[1]       Past Surgical History:       Surgical  History[2]      Medications:       Prior to Admission medications    Medication Sig Start Date End Date Taking? Authorizing Provider   alpha lipoic acid 300 mg capsule Take 300 mg by mouth once daily.    Historical Provider, MD   amLODIPine (Norvasc) 5 mg tablet TAKE ONE TABLET BY MOUTH EVERY DAY 2/28/25   Alysia HOWE DO   ascorbic acid (Vitamin C) 1,000 mg tablet Take 1 tablet (1,000 mg) by mouth once daily.    Historical Provider, MD   b complex 0.4 mg tablet Take 1 tablet by mouth once daily.    Historical Provider, MD   biotin/folic ac/vit Bcomp,C/Zn (BIOTIN FORTE ORAL) Take 1 tablet by mouth every other day.    Historical Provider, MD   cloNIDine (Catapres) 0.1 mg tablet Take 1 tablet (0.1 mg) by mouth 2 times a day. 2/7/25   Alysia Perea V DO   coenzyme Q-10 200 mg capsule Take 1 capsule (200 mg) by mouth once daily. 2/17/16   Historical Provider, MD   escitalopram (Lexapro) 10 mg tablet TAKE ONE TABLET BY MOUTH DAILY 4/15/25   Alysia HOWE DO   esomeprazole (NexIUM) 40 mg DR capsule TAKE ONE CAPSULE BY MOUTH ONCE DAILY 3/20/25   Alysia Perea V DO   gabapentin (Neurontin) 100 mg capsule Take 1-2 capsules (100-200 mg) by mouth once daily at bedtime. 11/7/24   Alysia Perea V DO   losartan (Cozaar) 100 mg tablet TAKE ONE TABLET BY MOUTH once DAILY. 4/15/25   Alysia Perea V DO   MAGNESIUM GLYCINATE ORAL Take 400 mg by mouth once daily.    Historical Provider, MD   omega-3 fatty acids 500 mg capsule Take by mouth once daily. 4/4/11   Historical Provider, MD   triamcinolone (Kenalog) 0.1 % ointment Apply topically 2 times a day as needed for irritation or rash. 3/10/25 7/8/25  Alysia HOWE DO   ZINC ORAL Take by mouth.    Historical ProviderMD ZIEGLER  Review of Systems     Blood, Urine   Date Value Ref Range Status   01/22/2024 NEGATIVE NEGATIVE Final     POC Blood, Urine   Date Value Ref Range Status   05/02/2024 NEGATIVE NEGATIVE Final     OCCULT BLOOD   Date Value Ref Range Status   02/18/2025 NEGATIVE  "NEGATIVE Final     Poc Nitrite, Urine   Date Value Ref Range Status   05/02/2024 NEGATIVE NEGATIVE Final     NITRITE   Date Value Ref Range Status   02/18/2025 NEGATIVE NEGATIVE Final     Urobilinogen, Urine   Date Value Ref Range Status   01/22/2024 <2.0 <2.0 mg/dL Final     POC Urobilinogen, Urine   Date Value Ref Range Status   05/02/2024 0.2 0.2, 1.0 EU/DL Final     POC Leukocytes, Urine   Date Value Ref Range Status   05/02/2024 SMALL (1+) (A) NEGATIVE Final         PHYSICAL EXAM:      There were no vitals taken for this visit.     No LMP recorded. Patient is postmenopausal.      Declines chaperone for physical exam.      Well developed, well nourished, in no apparent distress.   Neurologic/Psychiatric:  Awake, Alert and Oriented times 3.  Affect normal.     GENITAL/URINARY:       External Genitalia:  The patient has normal appearing external genitalia, normal skenes and bartholins glands, and a normal hair distribution.  Her vulva is without lesions, erythema or discharge.  It is non-tender with appropriate sensation.     Urethral Meatus:  Size normal, Location normal, Lesions absent, Prolapse absent,      Urethra:  Fullness absent, Masses absent,      Bladder:  Fullness absent, Masses absent, Tenderness absent,      Vagina:  General appearance normal, Discharge absent, Lesions absent, beautifully healed colpocleisis, no significant prolapse, no abnormalities      Anus/Perineum:  Lesions absent and Masses absent, normal rectal exam  Normal Perineum      Data and DIAGNOSTIC STUDIES REVIEWED   Imaging  No results found.    Cardiology, Vascular, and Other Imaging  No other imaging results found for the past 7 days     No results found for: \"URINECULTURE\", \"UAMICCOMM\"   Lab Results   Component Value Date    GLUCOSE 91 05/01/2025    CALCIUM 9.3 05/01/2025     05/01/2025    K 4.2 05/01/2025    CO2 30 05/01/2025    CL 99 05/01/2025    BUN 12 05/01/2025    CREATININE 0.68 05/01/2025     Lab Results   Component " Value Date    WBC 6.4 05/01/2025    HGB 13.1 05/01/2025    HCT 40.9 05/01/2025    MCV 83 05/01/2025     05/01/2025          Seema Moore MD             [1]   Past Medical History:  Diagnosis Date    Benign neoplasm of meninges, unspecified 07/21/2015    Meningioma    Bunion of unspecified foot     Bunion    Candidiasis, unspecified 02/10/2021    Antibiotic-induced yeast infection    COVID-19 11/03/2022    COVID    Cystocele, midline     Cystocele, midline    Encounter for other preprocedural examination 06/21/2017    Preoperative clearance    Essential (primary) hypertension     Hypertension, benign    Other abnormal and inconclusive findings on diagnostic imaging of breast 03/25/2016    Abnormal mammogram    Other general symptoms and signs 05/26/2020    Cold feeling    Other specified abnormal findings of blood chemistry 11/13/2020    Elevated TSH    Personal history of other diseases of the circulatory system 03/27/2015    History of hypertension    Personal history of other diseases of the female genital tract 10/21/2021    History of postmenopausal bleeding    Personal history of other diseases of the nervous system and sense organs     History of carpal tunnel syndrome    Personal history of other diseases of urinary system 06/27/2019    History of hematuria    Personal history of other diseases of urinary system 02/10/2021    History of pyelonephritis    Personal history of other endocrine, nutritional and metabolic disease     History of high cholesterol    Personal history of other specified conditions 07/27/2020    History of abdominal pain    Personal history of urinary (tract) infections 03/09/2021    History of urinary tract infection    Pyelonephritis 05/01/2023    Unspecified abnormal findings in urine 09/14/2020    Malodorous urine    Urinary tract infection, site not specified 10/23/2020    Acute lower UTI    Urinary tract infection, site not specified 11/22/2021    Acute UTI   [2]    Past Surgical History:  Procedure Laterality Date    BLADDER SUSPENSION      CATARACT EXTRACTION Bilateral 07/07/2017    Cataract Surgery    OTHER SURGICAL HISTORY Bilateral 05/13/2020    Carpal tunnel surgery    OTHER SURGICAL HISTORY Bilateral 05/13/2020    Bunionectomy

## 2025-05-24 ENCOUNTER — OFFICE VISIT (OUTPATIENT)
Dept: URGENT CARE | Age: 80
End: 2025-05-24
Payer: MEDICARE

## 2025-05-24 VITALS
RESPIRATION RATE: 20 BRPM | DIASTOLIC BLOOD PRESSURE: 85 MMHG | SYSTOLIC BLOOD PRESSURE: 144 MMHG | TEMPERATURE: 97.6 F | HEART RATE: 55 BPM | OXYGEN SATURATION: 98 %

## 2025-05-24 DIAGNOSIS — R30.0 DYSURIA: ICD-10-CM

## 2025-05-24 DIAGNOSIS — N30.01 ACUTE CYSTITIS WITH HEMATURIA: Primary | ICD-10-CM

## 2025-05-24 LAB
POC APPEARANCE, URINE: ABNORMAL
POC BILIRUBIN, URINE: NEGATIVE
POC BLOOD, URINE: ABNORMAL
POC COLOR, URINE: YELLOW
POC GLUCOSE, URINE: NEGATIVE MG/DL
POC KETONES, URINE: NEGATIVE MG/DL
POC LEUKOCYTES, URINE: ABNORMAL
POC NITRITE,URINE: NEGATIVE
POC PH, URINE: 6 PH
POC PROTEIN, URINE: ABNORMAL MG/DL
POC SPECIFIC GRAVITY, URINE: 1.01
POC UROBILINOGEN, URINE: 0.2 EU/DL

## 2025-05-24 RX ORDER — PHENAZOPYRIDINE HYDROCHLORIDE 100 MG/1
100 TABLET, FILM COATED ORAL 3 TIMES DAILY PRN
Qty: 9 TABLET | Refills: 0 | Status: SHIPPED | OUTPATIENT
Start: 2025-05-24 | End: 2025-05-27

## 2025-05-24 RX ORDER — NITROFURANTOIN 25; 75 MG/1; MG/1
100 CAPSULE ORAL 2 TIMES DAILY
Qty: 14 CAPSULE | Refills: 0 | Status: SHIPPED | OUTPATIENT
Start: 2025-05-24 | End: 2025-05-31

## 2025-05-24 ASSESSMENT — ENCOUNTER SYMPTOMS
FEVER: 0
FREQUENCY: 1
DYSURIA: 1

## 2025-05-24 NOTE — PROGRESS NOTES
Subjective   Patient ID: Valerie Franklin is a 79 y.o. female. They present today with a chief complaint of UTI.    History of Present Illness  HPI  Patient presents to urgent care for a chief complaint of UTI with symptoms over the last week patient complains of lower abdominal pressure frequency urgency and dysuria no reported fevers chills no vomiting or diarrhea  Past Medical History  Allergies as of 05/24/2025 - Reviewed 05/24/2025   Allergen Reaction Noted    Shellfish containing products Other and Unknown 07/06/2023       Prescriptions Prior to Admission[1]     Medical History[2]    Surgical History[3]     reports that she quit smoking about 23 years ago. Her smoking use included cigarettes. She started smoking about 53 years ago. She has a 30 pack-year smoking history. She has never used smokeless tobacco. She reports current alcohol use of about 1.0 - 2.0 standard drink of alcohol per week. She reports that she does not use drugs.    Review of Systems  Review of Systems   Constitutional:  Negative for fever.   Genitourinary:  Positive for dysuria, frequency and urgency.                                  Objective    Vitals:    05/24/25 1345   BP: 144/85   Pulse: 55   Resp: 20   Temp: 36.4 °C (97.6 °F)   TempSrc: Temporal   SpO2: 98%     No LMP recorded. Patient is postmenopausal.    Physical Exam  Vitals and nursing note reviewed.   Constitutional:       General: She is not in acute distress.     Appearance: Normal appearance. She is not ill-appearing, toxic-appearing or diaphoretic.   Cardiovascular:      Rate and Rhythm: Regular rhythm. Bradycardia present.   Pulmonary:      Effort: Pulmonary effort is normal. No respiratory distress.   Abdominal:      Tenderness: There is no right CVA tenderness or left CVA tenderness.   Skin:     Findings: No rash.   Neurological:      General: No focal deficit present.      Mental Status: She is alert and oriented to person, place, and time.   Psychiatric:         Mood  and Affect: Mood normal.         Behavior: Behavior normal.         Procedures    Point of Care Test & Imaging Results from this visit  Results for orders placed or performed in visit on 05/24/25   POCT UA Automated manually resulted   Result Value Ref Range    POC Color, Urine Yellow Straw, Yellow, Light-Yellow    POC Appearance, Urine Turbid (A) Clear    POC Glucose, Urine NEGATIVE NEGATIVE mg/dl    POC Bilirubin, Urine NEGATIVE NEGATIVE    POC Ketones, Urine NEGATIVE NEGATIVE mg/dl    POC Specific Gravity, Urine 1.015 1.005 - 1.035    POC Blood, Urine LARGE (3+) (A) NEGATIVE    POC PH, Urine 6.0 No Reference Range Established PH    POC Protein, Urine 15 (1+) (A) NEGATIVE mg/dl    POC Urobilinogen, Urine 0.2 0.2, 1.0 EU/DL    Poc Nitrite, Urine NEGATIVE NEGATIVE    POC Leukocytes, Urine LARGE (3+) (A) NEGATIVE      Imaging  No results found.    Cardiology, Vascular, and Other Imaging  No other imaging results found for the past 2 days      Diagnostic study results (if any) were reviewed by Andre Weiner PA-C.    Assessment/Plan   Allergies, medications, history, and pertinent labs/EKGs/Imaging reviewed by Andre Weiner PA-C.     Medical Decision Making  Patient replaced on Macrobid, I did discuss with patient that urinalysis indicative of urinary tract infection will send for culture and sensitivity, did also discuss with patient the antibiotic regimen change pending results, if worsening symptoms or development of severe abdominal pain back pain fevers chills or heart racing patient is to go to the ER patient verbalized understanding agreeable to plan discharge emergent care A+O x 4 stable condition no signs of distress    Orders and Diagnoses  Diagnoses and all orders for this visit:  Acute cystitis with hematuria  -     Urine Culture  -     nitrofurantoin, macrocrystal-monohydrate, (Macrobid) 100 mg capsule; Take 1 capsule (100 mg) by mouth 2 times a day for 7 days.  -     phenazopyridine (Pyridium)  100 mg tablet; Take 1 tablet (100 mg) by mouth 3 times a day as needed for bladder spasms for up to 3 days.  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    Meningioma    Bunion of unspecified foot     Bunion    Candidiasis, unspecified 02/10/2021    Antibiotic-induced yeast infection    COVID-19 11/03/2022    COVID    Cystocele, midline     Cystocele, midline    Encounter for other preprocedural examination 06/21/2017    Preoperative clearance    Essential (primary) hypertension     Hypertension, benign    Other abnormal and inconclusive findings on diagnostic imaging of breast 03/25/2016    Abnormal mammogram    Other general symptoms and signs 05/26/2020    Cold feeling    Other specified abnormal findings of blood chemistry 11/13/2020    Elevated TSH    Personal history of other diseases of the circulatory system 03/27/2015    History of hypertension    Personal history of other diseases of the female genital tract 10/21/2021    History of postmenopausal bleeding    Personal history of other diseases of the nervous system and sense organs     History of carpal tunnel syndrome    Personal history of other diseases of urinary system 06/27/2019    History of hematuria    Personal history of other diseases of urinary system 02/10/2021    History of pyelonephritis    Personal history of other endocrine, nutritional and metabolic disease     History of high cholesterol    Personal history of other specified conditions 07/27/2020    History of abdominal pain    Personal history of urinary (tract) infections 03/09/2021    History of urinary tract infection    Pyelonephritis 05/01/2023    Unspecified abnormal findings in urine 09/14/2020    Malodorous urine     Urinary tract infection, site not specified 10/23/2020    Acute lower UTI    Urinary tract infection, site not specified 11/22/2021    Acute UTI   [3]   Past Surgical History:  Procedure Laterality Date    BLADDER SUSPENSION      CATARACT EXTRACTION Bilateral 07/07/2017    Cataract Surgery    OTHER SURGICAL HISTORY Bilateral 05/13/2020    Carpal tunnel surgery    OTHER SURGICAL HISTORY Bilateral 05/13/2020    Bunionectomy

## 2025-05-26 LAB — BACTERIA UR CULT: NORMAL

## 2025-05-30 DIAGNOSIS — M54.16 LUMBAR RADICULOPATHY: ICD-10-CM

## 2025-05-30 RX ORDER — GABAPENTIN 100 MG/1
CAPSULE ORAL
Qty: 180 CAPSULE | Refills: 0 | Status: SHIPPED | OUTPATIENT
Start: 2025-05-30

## 2025-06-09 ENCOUNTER — TELEPHONE (OUTPATIENT)
Dept: OBSTETRICS AND GYNECOLOGY | Facility: CLINIC | Age: 80
End: 2025-06-09
Payer: MEDICARE

## 2025-06-09 DIAGNOSIS — N39.0 ACUTE UTI: ICD-10-CM

## 2025-06-09 RX ORDER — LEVOFLOXACIN 500 MG/1
500 TABLET, FILM COATED ORAL DAILY
Qty: 7 TABLET | Refills: 0 | Status: SHIPPED | OUTPATIENT
Start: 2025-06-09 | End: 2025-06-16

## 2025-06-09 NOTE — TELEPHONE ENCOUNTER
Valerie Franklin informed of urine culture results and the generic form of Antibiotic: Levaquin was sent to local pharmacy per Dr. Moore. Questions answered and understanding verbalized.

## 2025-07-14 DIAGNOSIS — R30.0 DYSURIA: Primary | ICD-10-CM

## 2025-07-14 RX ORDER — PHENAZOPYRIDINE HYDROCHLORIDE 200 MG/1
200 TABLET, FILM COATED ORAL 3 TIMES DAILY PRN
Qty: 10 TABLET | Refills: 0 | Status: SHIPPED | OUTPATIENT
Start: 2025-07-14 | End: 2025-07-17

## 2025-07-14 NOTE — PROGRESS NOTES
Spoke with patient's daughter re acute symptoms of dysuria. Instructed on use of OTC/rx pyridium for symptoms pending urine culture result. Of note, last urine culture on record in May demonstrated no growth and had received empiric Macrobid abx. Will follow up this urine culture prior to starting abx at this time. All questions answered.     Samantha Read MD   URPS Fellow

## 2025-07-17 LAB — BACTERIA UR CULT: NORMAL

## 2025-07-23 ENCOUNTER — TELEPHONE (OUTPATIENT)
Dept: PRIMARY CARE | Facility: CLINIC | Age: 80
End: 2025-07-23
Payer: MEDICARE

## 2025-07-23 DIAGNOSIS — K92.1 BLOOD IN STOOL: Primary | ICD-10-CM

## 2025-07-23 NOTE — TELEPHONE ENCOUNTER
"Pt called reporting black stool for one week and then recently bloody stool for several days. Denies lightheadedness or dizziness, but does confirm fatigue; reports she \"is always fatigued\". Reports that when she defecates and waits off on flushing, the blood is enough that it disperses in toilet bowl and taints water. Blood outlines feces in toilet. Blood is deep, pure red in color. Advised patient that if there are any acute changes, she must present to ER for workup. Made appmt for tomorrow 7/24 at 12:15pm, confirmed with dr to order stat cbc. Pt said she will report to lab asap.   "

## 2025-07-24 ENCOUNTER — OFFICE VISIT (OUTPATIENT)
Dept: PRIMARY CARE | Facility: CLINIC | Age: 80
End: 2025-07-24
Payer: MEDICARE

## 2025-07-24 VITALS
OXYGEN SATURATION: 96 % | SYSTOLIC BLOOD PRESSURE: 121 MMHG | BODY MASS INDEX: 25.87 KG/M2 | RESPIRATION RATE: 12 BRPM | WEIGHT: 123.8 LBS | HEART RATE: 63 BPM | DIASTOLIC BLOOD PRESSURE: 76 MMHG

## 2025-07-24 DIAGNOSIS — R19.5 DARK RED STOOL: Primary | ICD-10-CM

## 2025-07-24 LAB
ALBUMIN SERPL-MCNC: 4.3 G/DL (ref 3.6–5.1)
ALP SERPL-CCNC: 72 U/L (ref 37–153)
ALT SERPL-CCNC: 9 U/L (ref 6–29)
ANION GAP SERPL CALCULATED.4IONS-SCNC: 6 MMOL/L (CALC) (ref 7–17)
AST SERPL-CCNC: 12 U/L (ref 10–35)
BILIRUB SERPL-MCNC: 0.3 MG/DL (ref 0.2–1.2)
BUN SERPL-MCNC: 11 MG/DL (ref 7–25)
CALCIUM SERPL-MCNC: 9.4 MG/DL (ref 8.6–10.4)
CHLORIDE SERPL-SCNC: 99 MMOL/L (ref 98–110)
CO2 SERPL-SCNC: 29 MMOL/L (ref 20–32)
CREAT SERPL-MCNC: 0.61 MG/DL (ref 0.6–1)
EGFRCR SERPLBLD CKD-EPI 2021: 91 ML/MIN/1.73M2
ERYTHROCYTE [DISTWIDTH] IN BLOOD BY AUTOMATED COUNT: 13.2 % (ref 11–15)
GLUCOSE SERPL-MCNC: 81 MG/DL (ref 65–139)
HCT VFR BLD AUTO: 36.7 % (ref 35–45)
HGB BLD-MCNC: 12 G/DL (ref 11.7–15.5)
MCH RBC QN AUTO: 27.8 PG (ref 27–33)
MCHC RBC AUTO-ENTMCNC: 32.7 G/DL (ref 32–36)
MCV RBC AUTO: 85.2 FL (ref 80–100)
PLATELET # BLD AUTO: 337 THOUSAND/UL (ref 140–400)
PMV BLD REES-ECKER: 8.1 FL (ref 7.5–12.5)
POTASSIUM SERPL-SCNC: 4.5 MMOL/L (ref 3.5–5.3)
PROT SERPL-MCNC: 6.7 G/DL (ref 6.1–8.1)
RBC # BLD AUTO: 4.31 MILLION/UL (ref 3.8–5.1)
SODIUM SERPL-SCNC: 134 MMOL/L (ref 135–146)
WBC # BLD AUTO: 7.7 THOUSAND/UL (ref 3.8–10.8)

## 2025-07-24 PROCEDURE — 3074F SYST BP LT 130 MM HG: CPT | Performed by: INTERNAL MEDICINE

## 2025-07-24 PROCEDURE — 3078F DIAST BP <80 MM HG: CPT | Performed by: INTERNAL MEDICINE

## 2025-07-24 PROCEDURE — 1159F MED LIST DOCD IN RCRD: CPT | Performed by: INTERNAL MEDICINE

## 2025-07-24 PROCEDURE — 99213 OFFICE O/P EST LOW 20 MIN: CPT | Performed by: INTERNAL MEDICINE

## 2025-07-24 ASSESSMENT — ENCOUNTER SYMPTOMS
VOMITING: 0
LIGHT-HEADEDNESS: 0
NAUSEA: 0
FATIGUE: 0
ABDOMINAL PAIN: 0
BLOOD IN STOOL: 1
SHORTNESS OF BREATH: 0
DIZZINESS: 0

## 2025-07-24 NOTE — PATIENT INSTRUCTIONS
Please stop drinking the beet juice. If stools don't improve after, then get repeat labwork in 2 weeks and please see the gastroenterologist. If they do improve, then you do not need to do these things.

## 2025-07-24 NOTE — PROGRESS NOTES
Subjective   Patient ID: Valerie Franklin is a 79 y.o. female who presents for Rectal Bleeding (Drinks beet juice with crystal and tumeric almost every day at lunchtime/supper. Urine is not red. Friend is also drinking this juice and also had black stools. ).    Noticed her stool started to turn red and orange a few weeks ago. Over time has changed to now being a dark red. Sees no blood when she wipes.    Pt notes that for the last month she has been drinking beet juice with turmeric and crystal in it. Pt found out today that her friend, who is also drinking this juice, is also having darker red stools.    Denies abd pain, N/V. Isn't taking an iron pill. Last colonoscopy in 2020 was normal. Capsule endoscopy done then showed one area of angiodysplasia.    Denies feeling dizzy, lightheaded, SOB, or a change in energy levels (states she is chronically tired but this hasn't worsened anytime recently).        Review of Systems   Constitutional:  Negative for fatigue.   Respiratory:  Negative for shortness of breath.    Gastrointestinal:  Positive for blood in stool. Negative for abdominal pain, nausea and vomiting.   Neurological:  Negative for dizziness and light-headedness.       /76 (BP Location: Right arm, Patient Position: Sitting)   Pulse 63   Resp 12   Wt 56.2 kg (123 lb 12.8 oz)   SpO2 96%   BMI 25.87 kg/m²   Objective   Physical Exam  Constitutional:       General: She is not in acute distress.     Appearance: She is not ill-appearing, toxic-appearing or diaphoretic.   HENT:      Head: Normocephalic and atraumatic.   Abdominal:      General: Abdomen is flat. There is no distension.      Palpations: Abdomen is soft. There is no mass.      Tenderness: There is no abdominal tenderness. There is no guarding or rebound.      Hernia: No hernia is present.     Neurological:      Mental Status: She is alert.         Assessment/Plan   Problem List Items Addressed This Visit    None  Visit Diagnoses          Codes      Dark red stool    -  Primary R19.5    Relevant Orders    CBC    Referral to Gastroenterology        -Pt presenting w/ dark red stool that started a couple weeks ago. Denies any other GI symptoms, and has no other symptoms consistent with anemia. Blood counts this AM show pt is not anemic. Last colonoscopy/capsule from 2020.  -Hx suggestive that this could be from beet juice. Will stop and see if stools go back to normal color. If they do not, pt to repeat CBC in 2 weeks. Encouraged to schedule appt w/ GI from today just in case (can cancel if stools return to normal after stopping juice, though). Reviewed symptoms that would require more urgent eval, including need for ER visit. Pt expressed understand and agrees with plan.         Kerrie Mills MD 07/24/25 12:26 PM

## 2025-07-29 ENCOUNTER — TELEPHONE (OUTPATIENT)
Dept: PRIMARY CARE | Facility: CLINIC | Age: 80
End: 2025-07-29
Payer: MEDICARE

## 2025-08-01 ENCOUNTER — APPOINTMENT (OUTPATIENT)
Dept: PRIMARY CARE | Facility: CLINIC | Age: 80
End: 2025-08-01
Payer: MEDICARE

## 2025-08-04 DIAGNOSIS — I10 PRIMARY HYPERTENSION: ICD-10-CM

## 2025-08-04 RX ORDER — CLONIDINE HYDROCHLORIDE 0.1 MG/1
0.1 TABLET ORAL 2 TIMES DAILY
Qty: 180 TABLET | Refills: 1 | Status: SHIPPED | OUTPATIENT
Start: 2025-08-04

## 2025-08-05 ENCOUNTER — RESULTS FOLLOW-UP (OUTPATIENT)
Dept: PRIMARY CARE | Facility: CLINIC | Age: 80
End: 2025-08-05
Payer: MEDICARE

## 2025-08-05 LAB
ERYTHROCYTE [DISTWIDTH] IN BLOOD BY AUTOMATED COUNT: 13.2 % (ref 11–15)
HCT VFR BLD AUTO: 38 % (ref 35–45)
HGB BLD-MCNC: 12.1 G/DL (ref 11.7–15.5)
MCH RBC QN AUTO: 26.9 PG (ref 27–33)
MCHC RBC AUTO-ENTMCNC: 31.8 G/DL (ref 32–36)
MCV RBC AUTO: 84.4 FL (ref 80–100)
PLATELET # BLD AUTO: 337 THOUSAND/UL (ref 140–400)
PMV BLD REES-ECKER: 8.3 FL (ref 7.5–12.5)
RBC # BLD AUTO: 4.5 MILLION/UL (ref 3.8–5.1)
WBC # BLD AUTO: 5.9 THOUSAND/UL (ref 3.8–10.8)

## 2025-08-05 NOTE — TELEPHONE ENCOUNTER
----- Message from Kerrie Mills sent at 8/5/2025  9:53 AM EDT -----  I would think that would be fine. If stools change color again then we know why.  ----- Message -----  From: Elvia Barber LPN  Sent: 8/5/2025   9:39 AM EDT  To: Alysia HOWE DO; Kerrie Mills MD    ----- Message from Elvia Barber LPN sent at 8/5/2025  9:39 AM EDT -----      ----- Message -----  From: Kerrie Mills MD  Sent: 8/5/2025   7:32 AM EDT  To:  [id number] [place] [id number] Clinical Support Staff    Blood counts remained stable. If stools improved after stopping beet juice then do not need to see GI.  ----- Message -----  From: [facility] [employer] Results In  Sent: 8/5/2025   4:44 AM EDT  To: Kerrie Mills MD

## 2025-08-05 NOTE — TELEPHONE ENCOUNTER
----- Message from Kerrie Mills sent at 8/5/2025  7:32 AM EDT -----  Blood counts remained stable. If stools improved after stopping beet juice then do not need to see GI.  ----- Message -----  From: [facility] [employer] Results In  Sent: 8/5/2025   4:44 AM EDT  To: Kerrie Mills MD

## 2025-08-05 NOTE — TELEPHONE ENCOUNTER
Patient called back- she states understanding but is asking if she can return to drinking beet juice. Please advise

## 2025-08-07 DIAGNOSIS — R19.5 DARK RED STOOL: ICD-10-CM

## 2025-08-11 ENCOUNTER — HOSPITAL ENCOUNTER (OUTPATIENT)
Dept: RADIOLOGY | Facility: CLINIC | Age: 80
Discharge: HOME | End: 2025-08-11
Payer: MEDICARE

## 2025-08-11 ENCOUNTER — APPOINTMENT (OUTPATIENT)
Dept: PRIMARY CARE | Facility: CLINIC | Age: 80
End: 2025-08-11
Payer: MEDICARE

## 2025-08-11 VITALS
SYSTOLIC BLOOD PRESSURE: 125 MMHG | BODY MASS INDEX: 26.17 KG/M2 | DIASTOLIC BLOOD PRESSURE: 77 MMHG | WEIGHT: 125.2 LBS | HEART RATE: 70 BPM

## 2025-08-11 DIAGNOSIS — I10 ESSENTIAL HYPERTENSION, BENIGN: ICD-10-CM

## 2025-08-11 DIAGNOSIS — Z87.891 HISTORY OF PRIOR CIGARETTE SMOKING: ICD-10-CM

## 2025-08-11 DIAGNOSIS — D50.9 IRON DEFICIENCY ANEMIA, UNSPECIFIED IRON DEFICIENCY ANEMIA TYPE: ICD-10-CM

## 2025-08-11 DIAGNOSIS — R06.09 DOE (DYSPNEA ON EXERTION): ICD-10-CM

## 2025-08-11 DIAGNOSIS — M25.512 CHRONIC LEFT SHOULDER PAIN: Primary | ICD-10-CM

## 2025-08-11 DIAGNOSIS — G89.29 CHRONIC LEFT SHOULDER PAIN: ICD-10-CM

## 2025-08-11 DIAGNOSIS — G89.29 CHRONIC LEFT SHOULDER PAIN: Primary | ICD-10-CM

## 2025-08-11 DIAGNOSIS — M54.16 LUMBAR RADICULOPATHY: ICD-10-CM

## 2025-08-11 DIAGNOSIS — M25.512 CHRONIC LEFT SHOULDER PAIN: ICD-10-CM

## 2025-08-11 DIAGNOSIS — R20.2 PARESTHESIA OF BOTH FEET: ICD-10-CM

## 2025-08-11 PROCEDURE — 1160F RVW MEDS BY RX/DR IN RCRD: CPT | Performed by: INTERNAL MEDICINE

## 2025-08-11 PROCEDURE — 73030 X-RAY EXAM OF SHOULDER: CPT | Mod: LT

## 2025-08-11 PROCEDURE — 71046 X-RAY EXAM CHEST 2 VIEWS: CPT

## 2025-08-11 PROCEDURE — 99214 OFFICE O/P EST MOD 30 MIN: CPT | Performed by: INTERNAL MEDICINE

## 2025-08-11 PROCEDURE — 1159F MED LIST DOCD IN RCRD: CPT | Performed by: INTERNAL MEDICINE

## 2025-08-11 PROCEDURE — 73030 X-RAY EXAM OF SHOULDER: CPT | Mod: LEFT SIDE | Performed by: RADIOLOGY

## 2025-08-11 PROCEDURE — 3074F SYST BP LT 130 MM HG: CPT | Performed by: INTERNAL MEDICINE

## 2025-08-11 PROCEDURE — 71046 X-RAY EXAM CHEST 2 VIEWS: CPT | Performed by: RADIOLOGY

## 2025-08-11 PROCEDURE — 3078F DIAST BP <80 MM HG: CPT | Performed by: INTERNAL MEDICINE

## 2025-08-11 ASSESSMENT — ENCOUNTER SYMPTOMS
NAUSEA: 0
DIARRHEA: 0
FATIGUE: 1
DIAPHORESIS: 0
DECREASED CONCENTRATION: 0
CHEST TIGHTNESS: 0
WHEEZING: 0
COUGH: 0
ABDOMINAL PAIN: 0
ARTHRALGIAS: 1
ACTIVITY CHANGE: 0
FEVER: 0
NERVOUS/ANXIOUS: 0
AGITATION: 1
ABDOMINAL DISTENTION: 0
CONFUSION: 0
SHORTNESS OF BREATH: 0
SLEEP DISTURBANCE: 1
CHILLS: 0
DYSPHORIC MOOD: 0
VOMITING: 0
PALPITATIONS: 0
STRIDOR: 0
CONSTIPATION: 0
APPETITE CHANGE: 0
HYPERACTIVE: 0
BACK PAIN: 1
HALLUCINATIONS: 0
UNEXPECTED WEIGHT CHANGE: 0

## 2025-08-11 ASSESSMENT — PATIENT HEALTH QUESTIONNAIRE - PHQ9
2. FEELING DOWN, DEPRESSED OR HOPELESS: NOT AT ALL
1. LITTLE INTEREST OR PLEASURE IN DOING THINGS: NOT AT ALL
SUM OF ALL RESPONSES TO PHQ9 QUESTIONS 1 AND 2: 0

## 2025-08-13 ENCOUNTER — RESULTS FOLLOW-UP (OUTPATIENT)
Dept: PRIMARY CARE | Facility: CLINIC | Age: 80
End: 2025-08-13
Payer: MEDICARE

## 2025-08-26 ENCOUNTER — APPOINTMENT (OUTPATIENT)
Dept: ORTHOPEDIC SURGERY | Facility: CLINIC | Age: 80
End: 2025-08-26
Payer: MEDICARE

## 2025-08-26 ENCOUNTER — HOSPITAL ENCOUNTER (OUTPATIENT)
Dept: RADIOLOGY | Facility: EXTERNAL LOCATION | Age: 80
Discharge: HOME | End: 2025-08-26

## 2025-08-26 VITALS — HEIGHT: 58 IN | WEIGHT: 124 LBS | BODY MASS INDEX: 26.03 KG/M2

## 2025-08-26 DIAGNOSIS — M54.16 LUMBAR RADICULOPATHY: ICD-10-CM

## 2025-08-26 DIAGNOSIS — M25.512 ACUTE PAIN OF LEFT SHOULDER: ICD-10-CM

## 2025-08-26 DIAGNOSIS — M19.012 OSTEOARTHRITIS OF LEFT GLENOHUMERAL JOINT: Primary | ICD-10-CM

## 2025-08-26 DIAGNOSIS — I10 PRIMARY HYPERTENSION: ICD-10-CM

## 2025-08-26 PROCEDURE — 1160F RVW MEDS BY RX/DR IN RCRD: CPT | Performed by: FAMILY MEDICINE

## 2025-08-26 PROCEDURE — 20611 DRAIN/INJ JOINT/BURSA W/US: CPT | Performed by: FAMILY MEDICINE

## 2025-08-26 PROCEDURE — 1159F MED LIST DOCD IN RCRD: CPT | Performed by: FAMILY MEDICINE

## 2025-08-26 PROCEDURE — 99214 OFFICE O/P EST MOD 30 MIN: CPT | Performed by: FAMILY MEDICINE

## 2025-08-26 RX ORDER — AMLODIPINE BESYLATE 5 MG/1
5 TABLET ORAL DAILY
Qty: 90 TABLET | Refills: 1 | Status: SHIPPED | OUTPATIENT
Start: 2025-08-26

## 2025-08-26 RX ORDER — GABAPENTIN 100 MG/1
CAPSULE ORAL
Qty: 180 CAPSULE | Refills: 1 | Status: SHIPPED | OUTPATIENT
Start: 2025-08-26

## 2025-08-26 RX ORDER — TRIAMCINOLONE ACETONIDE 40 MG/ML
40 INJECTION, SUSPENSION INTRA-ARTICULAR; INTRAMUSCULAR
Status: COMPLETED | OUTPATIENT
Start: 2025-08-26 | End: 2025-08-26

## 2025-08-26 RX ORDER — LIDOCAINE HYDROCHLORIDE 20 MG/ML
2 INJECTION, SOLUTION INFILTRATION; PERINEURAL
Status: COMPLETED | OUTPATIENT
Start: 2025-08-26 | End: 2025-08-26

## 2025-08-26 RX ADMIN — TRIAMCINOLONE ACETONIDE 40 MG: 40 INJECTION, SUSPENSION INTRA-ARTICULAR; INTRAMUSCULAR at 13:41

## 2025-08-26 RX ADMIN — LIDOCAINE HYDROCHLORIDE 2 ML: 20 INJECTION, SOLUTION INFILTRATION; PERINEURAL at 13:41

## 2025-09-30 ENCOUNTER — APPOINTMENT (OUTPATIENT)
Dept: RESPIRATORY THERAPY | Facility: HOSPITAL | Age: 80
End: 2025-09-30
Payer: MEDICARE

## 2025-11-06 ENCOUNTER — APPOINTMENT (OUTPATIENT)
Dept: HEMATOLOGY/ONCOLOGY | Facility: CLINIC | Age: 80
End: 2025-11-06
Payer: MEDICARE

## 2025-11-11 ENCOUNTER — APPOINTMENT (OUTPATIENT)
Dept: PRIMARY CARE | Facility: CLINIC | Age: 80
End: 2025-11-11
Payer: MEDICARE